# Patient Record
Sex: FEMALE | Race: WHITE | NOT HISPANIC OR LATINO | Employment: OTHER | URBAN - METROPOLITAN AREA
[De-identification: names, ages, dates, MRNs, and addresses within clinical notes are randomized per-mention and may not be internally consistent; named-entity substitution may affect disease eponyms.]

---

## 2023-12-07 PROBLEM — J02.9 SORE THROAT: Status: RESOLVED | Noted: 2018-08-17 | Resolved: 2023-12-07

## 2023-12-07 PROBLEM — R07.9 CHEST PAIN: Status: RESOLVED | Noted: 2020-09-10 | Resolved: 2023-12-07

## 2023-12-07 PROBLEM — E78.00 PURE HYPERCHOLESTEROLEMIA, UNSPECIFIED: Status: ACTIVE | Noted: 2023-12-07

## 2023-12-07 PROBLEM — Z85.828 PERSONAL HISTORY OF OTHER MALIGNANT NEOPLASM OF SKIN: Status: ACTIVE | Noted: 2023-01-25

## 2023-12-07 PROBLEM — J01.90 ACUTE SINUSITIS: Status: RESOLVED | Noted: 2020-03-13 | Resolved: 2023-12-07

## 2023-12-07 PROBLEM — D22.60 MELANOCYTIC NEVI OF UNSPECIFIED UPPER LIMB, INCLUDING SHOULDER: Status: ACTIVE | Noted: 2023-01-25

## 2023-12-07 PROBLEM — I35.9 AORTIC VALVE CALCIFICATION: Status: ACTIVE | Noted: 2023-12-07

## 2023-12-07 PROBLEM — L81.4 OTHER MELANIN HYPERPIGMENTATION: Status: ACTIVE | Noted: 2023-01-25

## 2023-12-07 PROBLEM — L90.5 SCAR CONDITION AND FIBROSIS OF SKIN: Status: ACTIVE | Noted: 2023-01-25

## 2023-12-07 PROBLEM — M25.551 RIGHT HIP PAIN: Status: RESOLVED | Noted: 2023-12-07 | Resolved: 2023-12-07

## 2023-12-07 PROBLEM — L82.1 OTHER SEBORRHEIC KERATOSIS: Status: ACTIVE | Noted: 2023-01-25

## 2023-12-07 PROBLEM — M67.432 GANGLION, LEFT WRIST: Status: ACTIVE | Noted: 2023-01-25

## 2023-12-07 PROBLEM — M53.3 TAIL BONE PAIN: Status: RESOLVED | Noted: 2023-12-07 | Resolved: 2023-12-07

## 2023-12-07 PROBLEM — E55.9 HYPOVITAMINOSIS D: Status: ACTIVE | Noted: 2023-12-07

## 2023-12-07 PROBLEM — D18.01 HEMANGIOMA OF SKIN AND SUBCUTANEOUS TISSUE: Status: ACTIVE | Noted: 2023-01-25

## 2023-12-07 PROBLEM — R05.9 COUGH: Status: RESOLVED | Noted: 2020-03-13 | Resolved: 2023-12-07

## 2023-12-07 PROBLEM — D48.5 NEOPLASM OF UNCERTAIN BEHAVIOR OF SKIN: Status: ACTIVE | Noted: 2023-01-25

## 2023-12-07 PROBLEM — M54.50 LOWER BACK PAIN: Status: RESOLVED | Noted: 2023-12-07 | Resolved: 2023-12-07

## 2023-12-07 PROBLEM — D22.5 MELANOCYTIC NEVI OF TRUNK: Status: ACTIVE | Noted: 2023-01-25

## 2023-12-07 PROBLEM — L57.0 ACTINIC KERATOSIS: Status: ACTIVE | Noted: 2023-01-25

## 2023-12-07 PROBLEM — D22.70 MELANOCYTIC NEVI OF UNSPECIFIED LOWER LIMB, INCLUDING HIP: Status: ACTIVE | Noted: 2023-01-25

## 2023-12-07 PROBLEM — L91.8 OTHER HYPERTROPHIC DISORDERS OF THE SKIN: Status: ACTIVE | Noted: 2023-01-25

## 2023-12-07 PROBLEM — G25.81 RESTLESS LEG SYNDROME: Status: ACTIVE | Noted: 2023-12-07

## 2023-12-07 PROBLEM — L28.1 PRURIGO NODULARIS: Status: ACTIVE | Noted: 2023-01-25

## 2023-12-07 PROBLEM — F32.A ANXIETY AND DEPRESSION: Status: ACTIVE | Noted: 2023-12-07

## 2023-12-07 PROBLEM — R91.1 SOLITARY PULMONARY NODULE: Status: ACTIVE | Noted: 2023-12-07

## 2023-12-07 PROBLEM — M85.80 OSTEOPENIA: Status: ACTIVE | Noted: 2023-12-07

## 2023-12-07 PROBLEM — R53.83 FATIGUE: Status: ACTIVE | Noted: 2023-12-07

## 2023-12-07 PROBLEM — F41.9 ANXIETY AND DEPRESSION: Status: ACTIVE | Noted: 2023-12-07

## 2023-12-07 PROBLEM — D23.9 OTHER BENIGN NEOPLASM OF SKIN, UNSPECIFIED: Status: ACTIVE | Noted: 2023-01-25

## 2023-12-07 PROBLEM — M25.50 ARTHRALGIA OF MULTIPLE JOINTS: Status: ACTIVE | Noted: 2023-12-07

## 2023-12-07 PROBLEM — C44.311 BASAL CELL CARCINOMA OF SKIN OF NOSE: Status: ACTIVE | Noted: 2023-01-25

## 2023-12-07 PROBLEM — R11.0 NAUSEA IN ADULT: Status: ACTIVE | Noted: 2023-12-07

## 2023-12-07 PROBLEM — R25.2 BILATERAL LEG CRAMPS: Status: ACTIVE | Noted: 2020-09-10

## 2023-12-07 RX ORDER — ESCITALOPRAM OXALATE 10 MG/1
1 TABLET ORAL DAILY
COMMUNITY
Start: 2021-02-02 | End: 2023-12-11 | Stop reason: ALTCHOICE

## 2023-12-07 RX ORDER — PANTOPRAZOLE SODIUM 40 MG/1
1 TABLET, DELAYED RELEASE ORAL DAILY
COMMUNITY
Start: 2022-07-26 | End: 2023-12-11 | Stop reason: ALTCHOICE

## 2023-12-07 ASSESSMENT — PROMIS GLOBAL HEALTH SCALE
RATE_SOCIAL_SATISFACTION: VERY GOOD
RATE_PHYSICAL_HEALTH: VERY GOOD
RATE_GENERAL_HEALTH: VERY GOOD
CARRYOUT_SOCIAL_ACTIVITIES: VERY GOOD
CARRYOUT_PHYSICAL_ACTIVITIES: COMPLETELY
RATE_MENTAL_HEALTH: VERY GOOD
RATE_AVERAGE_FATIGUE: MILD
RATE_AVERAGE_PAIN: 2
RATE_QUALITY_OF_LIFE: VERY GOOD
EMOTIONAL_PROBLEMS: NEVER

## 2023-12-11 ENCOUNTER — OFFICE VISIT (OUTPATIENT)
Dept: PRIMARY CARE | Facility: CLINIC | Age: 68
End: 2023-12-11
Payer: MEDICARE

## 2023-12-11 VITALS
BODY MASS INDEX: 26.89 KG/M2 | DIASTOLIC BLOOD PRESSURE: 64 MMHG | WEIGHT: 157.5 LBS | TEMPERATURE: 97.9 F | HEART RATE: 60 BPM | HEIGHT: 64 IN | SYSTOLIC BLOOD PRESSURE: 100 MMHG | OXYGEN SATURATION: 96 %

## 2023-12-11 DIAGNOSIS — Z23 NEED FOR INFLUENZA VACCINATION: ICD-10-CM

## 2023-12-11 DIAGNOSIS — M85.80 OSTEOPENIA, UNSPECIFIED LOCATION: ICD-10-CM

## 2023-12-11 DIAGNOSIS — M70.61 TROCHANTERIC BURSITIS OF RIGHT HIP: ICD-10-CM

## 2023-12-11 DIAGNOSIS — Z12.31 ENCOUNTER FOR SCREENING MAMMOGRAM FOR MALIGNANT NEOPLASM OF BREAST: ICD-10-CM

## 2023-12-11 DIAGNOSIS — Z00.00 ROUTINE GENERAL MEDICAL EXAMINATION AT HEALTH CARE FACILITY: Primary | ICD-10-CM

## 2023-12-11 DIAGNOSIS — E78.00 PURE HYPERCHOLESTEROLEMIA, UNSPECIFIED: ICD-10-CM

## 2023-12-11 PROCEDURE — 1159F MED LIST DOCD IN RCRD: CPT | Performed by: FAMILY MEDICINE

## 2023-12-11 PROCEDURE — G0008 ADMIN INFLUENZA VIRUS VAC: HCPCS | Performed by: FAMILY MEDICINE

## 2023-12-11 PROCEDURE — 90662 IIV NO PRSV INCREASED AG IM: CPT | Performed by: FAMILY MEDICINE

## 2023-12-11 PROCEDURE — 99213 OFFICE O/P EST LOW 20 MIN: CPT | Performed by: FAMILY MEDICINE

## 2023-12-11 PROCEDURE — 1170F FXNL STATUS ASSESSED: CPT | Performed by: FAMILY MEDICINE

## 2023-12-11 PROCEDURE — 1036F TOBACCO NON-USER: CPT | Performed by: FAMILY MEDICINE

## 2023-12-11 PROCEDURE — G0439 PPPS, SUBSEQ VISIT: HCPCS | Performed by: FAMILY MEDICINE

## 2023-12-11 PROCEDURE — 1160F RVW MEDS BY RX/DR IN RCRD: CPT | Performed by: FAMILY MEDICINE

## 2023-12-11 RX ORDER — METHYLPREDNISOLONE 4 MG/1
TABLET ORAL
Qty: 21 TABLET | Refills: 0 | Status: SHIPPED | OUTPATIENT
Start: 2023-12-11 | End: 2023-12-18

## 2023-12-11 ASSESSMENT — ACTIVITIES OF DAILY LIVING (ADL)
TAKING_MEDICATION: INDEPENDENT
DRESSING: INDEPENDENT
MANAGING_FINANCES: INDEPENDENT
BATHING: INDEPENDENT
DOING_HOUSEWORK: INDEPENDENT
GROCERY_SHOPPING: INDEPENDENT

## 2023-12-11 ASSESSMENT — PATIENT HEALTH QUESTIONNAIRE - PHQ9
1. LITTLE INTEREST OR PLEASURE IN DOING THINGS: NOT AT ALL
2. FEELING DOWN, DEPRESSED OR HOPELESS: NOT AT ALL
SUM OF ALL RESPONSES TO PHQ9 QUESTIONS 1 AND 2: 0

## 2023-12-11 NOTE — PATIENT INSTRUCTIONS
Get your blood work as ordered.  You should hear from our office with results whether they are normal are not within a few days.  Please call the office if you do not hear from us. \    After you get testing done you will get notified from our office with regards to your results whether they are normal or not.  If you are registered in the electronic health record we will send you information in that system.  If you have any questions or need clarification please feel free to call the office.         Your bone density shows osteopenia which is a thinning of the bones.  I recommend that you take vitamin D 1000 units daily, get an adequate intake of calcium daily.  Postmenopausal women should get 1500 mg of calcium per day.  This is about 4-5 servings per day of calcium rich foods such as dairy products or almond milk.  If you are not getting 1500 mg per day and would recommend adding on a calcium supplement.  Also doing weightbearing exercise is helpful.  We should repeat a DEXA scan 2 years.      Recommend current Covid booster, flu vaccine  RSV vaccine discussed

## 2023-12-11 NOTE — PROGRESS NOTES
"Subjective   Reason for Visit: Sherice Apodaca is an 68 y.o. female here for a Medicare Wellness visit.     Past Medical, Surgical, and Family History reviewed and updated in chart.    Reviewed all medications by prescribing practitioner or clinical pharmacist (such as prescriptions, OTCs, herbal therapies and supplements) and documented in the medical record.    Osteopenia     walking routinely     Has right hip pain   Worse with laying on that side     Xrays in past     Mood is good     GERD is ok         Patient Care Team:  Melody Lin MD as PCP - General (Family Medicine)  Melody Lin MD as PCP [facility]     Review of Systems    Objective   Vitals:  /64   Pulse 60   Temp 36.6 °C (97.9 °F)   Ht 1.626 m (5' 4\")   Wt 71.4 kg (157 lb 8 oz)   SpO2 96%   BMI 27.03 kg/m²       Physical Exam  Constitutional:       General: She is not in acute distress.     Appearance: Normal appearance.   HENT:      Head: Normocephalic and atraumatic.      Right Ear: Tympanic membrane, ear canal and external ear normal.      Left Ear: Tympanic membrane, ear canal and external ear normal.      Nose: Nose normal. No congestion or rhinorrhea.      Mouth/Throat:      Mouth: Mucous membranes are moist.      Pharynx: No oropharyngeal exudate or posterior oropharyngeal erythema.   Eyes:      Extraocular Movements: Extraocular movements intact.      Conjunctiva/sclera: Conjunctivae normal.      Pupils: Pupils are equal, round, and reactive to light.   Neck:      Vascular: No carotid bruit.   Cardiovascular:      Rate and Rhythm: Normal rate and regular rhythm.      Heart sounds: No murmur heard.  Pulmonary:      Effort: Pulmonary effort is normal.      Breath sounds: Normal breath sounds. No wheezing or rhonchi.   Abdominal:      General: Bowel sounds are normal.      Palpations: Abdomen is soft.   Musculoskeletal:         General: No swelling.      Cervical back: No rigidity.      Right lower leg: No " edema.      Left lower leg: No edema.      Comments: Tenderness to palpation along the right greater trochanter  Right hip: No pain with rotation   Lymphadenopathy:      Cervical: No cervical adenopathy.   Skin:     General: Skin is warm and dry.      Findings: No rash.   Neurological:      General: No focal deficit present.      Mental Status: She is alert and oriented to person, place, and time.      Cranial Nerves: No cranial nerve deficit.      Gait: Gait normal.      Deep Tendon Reflexes: Reflexes normal.   Psychiatric:         Mood and Affect: Mood normal.         Behavior: Behavior normal.         Assessment/Plan   Problem List Items Addressed This Visit       Anxiety and depression    Relevant Orders    CBC    Comprehensive Metabolic Panel    Lipid Panel    Vitamin D 25-Hydroxy,Total (for eval of Vitamin D levels)    Osteopenia    Relevant Orders    CBC    Comprehensive Metabolic Panel    Lipid Panel    Vitamin D 25-Hydroxy,Total (for eval of Vitamin D levels)    Pure hypercholesterolemia, unspecified    Relevant Orders    CBC    Comprehensive Metabolic Panel    Lipid Panel    Vitamin D 25-Hydroxy,Total (for eval of Vitamin D levels)     Other Visit Diagnoses       Routine general medical examination at health care facility    -  Primary    Relevant Orders    CBC    Comprehensive Metabolic Panel    Lipid Panel    Vitamin D 25-Hydroxy,Total (for eval of Vitamin D levels)    Gastroesophageal reflux disease without esophagitis        Relevant Orders    CBC    Comprehensive Metabolic Panel    Lipid Panel    Vitamin D 25-Hydroxy,Total (for eval of Vitamin D levels)    Encounter for screening mammogram for malignant neoplasm of breast        Relevant Orders    BI mammo bilateral screening tomosynthesis    CBC    Comprehensive Metabolic Panel    Lipid Panel    Vitamin D 25-Hydroxy,Total (for eval of Vitamin D levels)

## 2023-12-12 ENCOUNTER — LAB (OUTPATIENT)
Dept: LAB | Facility: LAB | Age: 68
End: 2023-12-12
Payer: MEDICARE

## 2023-12-12 DIAGNOSIS — M85.80 OSTEOPENIA, UNSPECIFIED LOCATION: ICD-10-CM

## 2023-12-12 DIAGNOSIS — E78.00 PURE HYPERCHOLESTEROLEMIA, UNSPECIFIED: ICD-10-CM

## 2023-12-12 DIAGNOSIS — Z12.31 ENCOUNTER FOR SCREENING MAMMOGRAM FOR MALIGNANT NEOPLASM OF BREAST: ICD-10-CM

## 2023-12-12 DIAGNOSIS — Z00.00 ROUTINE GENERAL MEDICAL EXAMINATION AT HEALTH CARE FACILITY: ICD-10-CM

## 2023-12-12 LAB
25(OH)D3 SERPL-MCNC: 18 NG/ML (ref 30–100)
ALBUMIN SERPL BCP-MCNC: 4.1 G/DL (ref 3.4–5)
ALP SERPL-CCNC: 47 U/L (ref 33–136)
ALT SERPL W P-5'-P-CCNC: 15 U/L (ref 7–45)
ANION GAP SERPL CALC-SCNC: 13 MMOL/L (ref 10–20)
AST SERPL W P-5'-P-CCNC: 14 U/L (ref 9–39)
BILIRUB SERPL-MCNC: 0.5 MG/DL (ref 0–1.2)
BUN SERPL-MCNC: 17 MG/DL (ref 6–23)
CALCIUM SERPL-MCNC: 9.1 MG/DL (ref 8.6–10.3)
CHLORIDE SERPL-SCNC: 106 MMOL/L (ref 98–107)
CHOLEST SERPL-MCNC: 210 MG/DL (ref 0–199)
CHOLESTEROL/HDL RATIO: 3.4
CO2 SERPL-SCNC: 27 MMOL/L (ref 21–32)
CREAT SERPL-MCNC: 0.71 MG/DL (ref 0.5–1.05)
ERYTHROCYTE [DISTWIDTH] IN BLOOD BY AUTOMATED COUNT: 12.3 % (ref 11.5–14.5)
GFR SERPL CREATININE-BSD FRML MDRD: >90 ML/MIN/1.73M*2
GLUCOSE SERPL-MCNC: 88 MG/DL (ref 74–99)
HCT VFR BLD AUTO: 40.6 % (ref 36–46)
HDLC SERPL-MCNC: 60.9 MG/DL
HGB BLD-MCNC: 12.9 G/DL (ref 12–16)
LDLC SERPL CALC-MCNC: 135 MG/DL
MCH RBC QN AUTO: 30.5 PG (ref 26–34)
MCHC RBC AUTO-ENTMCNC: 31.8 G/DL (ref 32–36)
MCV RBC AUTO: 96 FL (ref 80–100)
NON HDL CHOLESTEROL: 149 MG/DL (ref 0–149)
NRBC BLD-RTO: 0 /100 WBCS (ref 0–0)
PLATELET # BLD AUTO: 284 X10*3/UL (ref 150–450)
POTASSIUM SERPL-SCNC: 4.8 MMOL/L (ref 3.5–5.3)
PROT SERPL-MCNC: 6.4 G/DL (ref 6.4–8.2)
RBC # BLD AUTO: 4.23 X10*6/UL (ref 4–5.2)
SODIUM SERPL-SCNC: 141 MMOL/L (ref 136–145)
TRIGL SERPL-MCNC: 69 MG/DL (ref 0–149)
VLDL: 14 MG/DL (ref 0–40)
WBC # BLD AUTO: 4.8 X10*3/UL (ref 4.4–11.3)

## 2023-12-12 PROCEDURE — 85027 COMPLETE CBC AUTOMATED: CPT

## 2023-12-12 PROCEDURE — 80053 COMPREHEN METABOLIC PANEL: CPT

## 2023-12-12 PROCEDURE — 82306 VITAMIN D 25 HYDROXY: CPT

## 2023-12-12 PROCEDURE — 80061 LIPID PANEL: CPT

## 2023-12-12 PROCEDURE — 36415 COLL VENOUS BLD VENIPUNCTURE: CPT

## 2023-12-13 DIAGNOSIS — M85.80 OSTEOPENIA, UNSPECIFIED LOCATION: ICD-10-CM

## 2023-12-13 DIAGNOSIS — R79.89 LOW VITAMIN D LEVEL: Primary | ICD-10-CM

## 2023-12-13 NOTE — RESULT ENCOUNTER NOTE
Very slightly elevated cholesterol, recommend keep weight down regular exercise  Vitamin D is pretty low I would recommend 2000 units/day over-the-counter and repeat vitamin D level in 3 months, rest of blood work normal

## 2024-01-22 ENCOUNTER — HOSPITAL ENCOUNTER (OUTPATIENT)
Dept: RADIOLOGY | Facility: HOSPITAL | Age: 69
Discharge: HOME | End: 2024-01-22
Payer: MEDICARE

## 2024-01-22 DIAGNOSIS — Z12.31 ENCOUNTER FOR SCREENING MAMMOGRAM FOR MALIGNANT NEOPLASM OF BREAST: ICD-10-CM

## 2024-01-22 PROCEDURE — 77063 BREAST TOMOSYNTHESIS BI: CPT | Performed by: RADIOLOGY

## 2024-01-22 PROCEDURE — 77067 SCR MAMMO BI INCL CAD: CPT

## 2024-01-22 PROCEDURE — 77067 SCR MAMMO BI INCL CAD: CPT | Performed by: RADIOLOGY

## 2024-01-31 ENCOUNTER — OFFICE VISIT (OUTPATIENT)
Dept: DERMATOLOGY | Facility: CLINIC | Age: 69
End: 2024-01-31
Payer: MEDICARE

## 2024-01-31 DIAGNOSIS — D22.9 BENIGN NEVUS: ICD-10-CM

## 2024-01-31 DIAGNOSIS — L57.9 SKIN CHANGES DUE TO CHRONIC EXPOSURE TO NONIONIZING RADIATION: ICD-10-CM

## 2024-01-31 DIAGNOSIS — L81.4 LENTIGO: ICD-10-CM

## 2024-01-31 DIAGNOSIS — L90.5 SCAR CONDITIONS AND FIBROSIS OF SKIN: ICD-10-CM

## 2024-01-31 DIAGNOSIS — L82.1 SEBORRHEIC KERATOSIS: ICD-10-CM

## 2024-01-31 DIAGNOSIS — Z85.828 HISTORY OF NONMELANOMA SKIN CANCER: ICD-10-CM

## 2024-01-31 DIAGNOSIS — D18.01 ANGIOMA OF SKIN: Primary | ICD-10-CM

## 2024-01-31 PROCEDURE — 1160F RVW MEDS BY RX/DR IN RCRD: CPT | Performed by: NURSE PRACTITIONER

## 2024-01-31 PROCEDURE — 99213 OFFICE O/P EST LOW 20 MIN: CPT | Performed by: NURSE PRACTITIONER

## 2024-01-31 PROCEDURE — 1159F MED LIST DOCD IN RCRD: CPT | Performed by: NURSE PRACTITIONER

## 2024-01-31 PROCEDURE — 1036F TOBACCO NON-USER: CPT | Performed by: NURSE PRACTITIONER

## 2024-01-31 NOTE — PROGRESS NOTES
Subjective     hSerice Apodaca is a 68 y.o. female who presents for the following: Skin Check.     Review of Systems:  No other skin or systemic complaints other than what is documented elsewhere in the note.    The following portions of the chart were reviewed this encounter and updated as appropriate:   Tobacco  Allergies  Meds  Problems  Med Hx  Surg Hx         Skin Cancer History  No skin cancer on file.      Specialty Problems          Dermatology Problems    Actinic keratosis    Hemangioma of skin and subcutaneous tissue    Melanocytic nevi of trunk    Melanocytic nevi of unspecified lower limb, including hip    Melanocytic nevi of unspecified upper limb, including shoulder    Neoplasm of uncertain behavior of skin    Other benign neoplasm of skin, unspecified    Other hypertrophic disorders of the skin    Other melanin hyperpigmentation    Other seborrheic keratosis    Personal history of other malignant neoplasm of skin    Prurigo nodularis    Scar condition and fibrosis of skin        Objective   Well appearing patient in no apparent distress; mood and affect are within normal limits.    A full examination was performed including scalp, head, eyes, ears, nose, lips, neck, chest, axillae, abdomen, back, buttocks, bilateral upper extremities, bilateral lower extremities, hands, feet, fingers, toes, fingernails, and toenails. All findings within normal limits unless otherwise noted below.      Assessment/Plan   1. Angioma of skin  Scattered cherry-red papule(s).    A cherry hemangioma is a small macule (small, flat, smooth area) or papule (small, solid bump) formed from an overgrowth of tiny blood vessels in the skin. Cherry hemangiomas are characteristically red or purplish in color. They often first appear in middle adulthood and usually increase in number with age. Cherry hemangiomas are noncancerous (benign) and are common in adults.    The present appearance of the lesion is not worrisome but it  should continue to be observed and testing/treatment may be warranted if change occurs.    Related Procedures  Follow Up In Dermatology - Established Patient    2. Benign nevus  Scattered, uniform and benign-appearing, regular brown melanocytic papules and macules.    1. Right axilla has a 2 x 2 mm black brown papule  2. Left buttocks has a 2.5 x 4 mm tan brown macule with homogenous pattern with reticular pattern on the medial aspect     The present appearance of the lesions are not worrisome but it should continue to be observed and testing/treatment may be warranted if change occurs.    Lesions being monitored are stable.     Related Procedures  Follow Up In Dermatology - Established Patient    3. Seborrheic keratosis  Stuck on verrucous, tan-brown papules and plaques.      Seborrheic keratoses are common noncancerous (benign) growths of unknown cause seen in adults due to a thickening of an area of the top skin layer. Seborrheic keratoses may appear as if they are stuck on to the skin. They have distinct borders, and they may appear as papules (small, solid bumps) or plaques (solid, raised patches that are bigger than a thumbnail). They may be the same color as your skin, or they may be pink, light brown, darker brown, or very dark brown, or sometimes may appear black.    There is no way to prevent new seborrheic keratoses from forming. Seborrheic keratoses can be removed, but removal is considered a cosmetic issue and is usually not covered by insurance.    PLAN  No treatment is needed unless there is irritation from clothing, such as itching or bleeding.  2.   Some lotions containing alpha hydroxy acids, salicylic acid, or urea may make the areas feel smoother with regular use but will not eliminate them.    Related Procedures  Follow Up In Dermatology - Established Patient    4. Lentigo  Scattered tan macules in sun-exposed areas.    A solar lentigo (plural, solar lentigines), also known as a sun-induced  freckle or senile lentigo, is a dark (hyperpigmented) lesion caused by natural or artificial ultraviolet (UV) light. Solar lentigines may be single or multiple. This type of lentigo is different from a simple lentigo (lentigo simplex) because it is caused by exposure to UV light. Solar lentigines are benign, but they do indicate excessive sun exposure, a risk factor for the development of skin cancer.    To prevent solar lentigines, avoid exposure to sunlight in midday (10 AM to 3 PM), wear sun-protective clothing (tightly woven clothes and hats), and apply sunscreen (SPF 30 UVA and UVB block).    The present appearance of the lesion is not worrisome but it should continue to be observed and testing/treatment may be warranted if change occurs.    Related Procedures  Follow Up In Dermatology - Established Patient    5. Scar conditions and fibrosis of skin  Right Nasal Sidewall  Well healed scar at the site(s) of prior treatment with no evidence of recurrence.          The scar is clear, there is no evidence of recurrence.  The present appearance of the scar is not worrisome but it should continue to be observed and testing/treatment may be warranted if change occurs.      Related Procedures  Follow Up In Dermatology - Established Patient    6. History of nonmelanoma skin cancer    ABCDEs of melanoma and atypical moles were discussed with the patient.    Patient was instructed to perform monthly self skin examination.  We recommended that the patient have regular full skin exams given an increased risk of subsequent skin cancers.    The patient was instructed to use sun protective behaviors including use of broad spectrum sunscreens and sun protective clothing to reduce risk of skin cancers.    Warning signs of non-melanoma skin cancer discussed.    Related Procedures  Follow Up In Dermatology - Established Patient    7. Skin changes due to chronic exposure to nonionizing radiation  Actinic changes in the form of  freckles, lentigines and hyper/hypopigmentation     ABCDEs of melanoma and atypical moles were discussed with the patient.    Patient was instructed to perform monthly self skin examination.  We recommended that the patient have regular full skin exams given an increased risk of subsequent skin cancers.    The patient was instructed to use sun protective behaviors including use of broad spectrum sunscreens and sun protective clothing to reduce risk of skin cancers.    Warning signs of non-melanoma skin cancer discussed.    Related Procedures  Follow Up In Dermatology - Established Patient        Return to clinic in 1 year for skin check/follow up or sooner if needed

## 2024-01-31 NOTE — PATIENT INSTRUCTIONS

## 2024-12-03 PROBLEM — R45.4 IRRITABLE MOOD: Status: ACTIVE | Noted: 2024-12-03

## 2024-12-03 PROBLEM — R63.5 WEIGHT GAIN: Status: ACTIVE | Noted: 2024-12-03

## 2024-12-03 PROBLEM — H25.13 NUCLEAR SENILE CATARACT OF BOTH EYES: Status: ACTIVE | Noted: 2017-09-06

## 2024-12-03 PROBLEM — M76.00 GLUTEAL TENDINITIS: Status: ACTIVE | Noted: 2024-12-03

## 2024-12-03 PROBLEM — M19.032 OSTEOARTHRITIS OF LEFT WRIST: Status: ACTIVE | Noted: 2024-12-03

## 2024-12-03 PROBLEM — M70.61 TROCHANTERIC BURSITIS OF RIGHT HIP: Status: ACTIVE | Noted: 2024-12-03

## 2024-12-05 ENCOUNTER — APPOINTMENT (OUTPATIENT)
Dept: PRIMARY CARE | Facility: CLINIC | Age: 69
End: 2024-12-05
Payer: MEDICARE

## 2025-01-02 ENCOUNTER — APPOINTMENT (OUTPATIENT)
Dept: PRIMARY CARE | Facility: CLINIC | Age: 70
End: 2025-01-02
Payer: MEDICARE

## 2025-01-02 VITALS
SYSTOLIC BLOOD PRESSURE: 108 MMHG | TEMPERATURE: 97.9 F | BODY MASS INDEX: 26.29 KG/M2 | DIASTOLIC BLOOD PRESSURE: 68 MMHG | HEIGHT: 64 IN | WEIGHT: 154 LBS | HEART RATE: 68 BPM | OXYGEN SATURATION: 97 %

## 2025-01-02 DIAGNOSIS — Z12.31 ENCOUNTER FOR SCREENING MAMMOGRAM FOR MALIGNANT NEOPLASM OF BREAST: ICD-10-CM

## 2025-01-02 DIAGNOSIS — Z00.00 ROUTINE GENERAL MEDICAL EXAMINATION AT HEALTH CARE FACILITY: Primary | ICD-10-CM

## 2025-01-02 DIAGNOSIS — M70.61 TROCHANTERIC BURSITIS OF RIGHT HIP: ICD-10-CM

## 2025-01-02 DIAGNOSIS — E78.2 COMBINED HYPERLIPIDEMIA: ICD-10-CM

## 2025-01-02 DIAGNOSIS — I35.0 NONRHEUMATIC AORTIC VALVE STENOSIS: ICD-10-CM

## 2025-01-02 DIAGNOSIS — E55.9 HYPOVITAMINOSIS D: ICD-10-CM

## 2025-01-02 DIAGNOSIS — Z13.220 LIPID SCREENING: ICD-10-CM

## 2025-01-02 DIAGNOSIS — M85.89 OSTEOPENIA OF MULTIPLE SITES: ICD-10-CM

## 2025-01-02 DIAGNOSIS — E78.2 MIXED HYPERLIPIDEMIA: ICD-10-CM

## 2025-01-02 DIAGNOSIS — M25.551 PAIN OF RIGHT HIP: ICD-10-CM

## 2025-01-02 PROBLEM — F41.9 ANXIETY AND DEPRESSION: Status: RESOLVED | Noted: 2023-12-07 | Resolved: 2025-01-02

## 2025-01-02 PROBLEM — D23.9 OTHER BENIGN NEOPLASM OF SKIN, UNSPECIFIED: Status: RESOLVED | Noted: 2023-01-25 | Resolved: 2025-01-02

## 2025-01-02 PROBLEM — M25.50 ARTHRALGIA OF MULTIPLE JOINTS: Status: RESOLVED | Noted: 2023-12-07 | Resolved: 2025-01-02

## 2025-01-02 PROBLEM — M67.432 GANGLION, LEFT WRIST: Status: RESOLVED | Noted: 2023-01-25 | Resolved: 2025-01-02

## 2025-01-02 PROBLEM — R11.0 NAUSEA IN ADULT: Status: RESOLVED | Noted: 2023-12-07 | Resolved: 2025-01-02

## 2025-01-02 PROBLEM — D48.5 NEOPLASM OF UNCERTAIN BEHAVIOR OF SKIN: Status: RESOLVED | Noted: 2023-01-25 | Resolved: 2025-01-02

## 2025-01-02 PROBLEM — R45.4 IRRITABLE MOOD: Status: RESOLVED | Noted: 2024-12-03 | Resolved: 2025-01-02

## 2025-01-02 PROBLEM — E78.00 PURE HYPERCHOLESTEROLEMIA, UNSPECIFIED: Status: RESOLVED | Noted: 2023-12-07 | Resolved: 2025-01-02

## 2025-01-02 PROBLEM — C44.311 BASAL CELL CARCINOMA OF SKIN OF NOSE: Status: RESOLVED | Noted: 2023-01-25 | Resolved: 2025-01-02

## 2025-01-02 PROBLEM — R63.5 WEIGHT GAIN: Status: RESOLVED | Noted: 2024-12-03 | Resolved: 2025-01-02

## 2025-01-02 PROBLEM — R25.2 BILATERAL LEG CRAMPS: Status: RESOLVED | Noted: 2020-09-10 | Resolved: 2025-01-02

## 2025-01-02 PROBLEM — M76.00 GLUTEAL TENDINITIS: Status: RESOLVED | Noted: 2024-12-03 | Resolved: 2025-01-02

## 2025-01-02 PROBLEM — D22.70 MELANOCYTIC NEVI OF UNSPECIFIED LOWER LIMB, INCLUDING HIP: Status: RESOLVED | Noted: 2023-01-25 | Resolved: 2025-01-02

## 2025-01-02 PROBLEM — L90.5 SCAR CONDITION AND FIBROSIS OF SKIN: Status: RESOLVED | Noted: 2023-01-25 | Resolved: 2025-01-02

## 2025-01-02 PROBLEM — D22.5 MELANOCYTIC NEVI OF TRUNK: Status: RESOLVED | Noted: 2023-01-25 | Resolved: 2025-01-02

## 2025-01-02 PROBLEM — L81.4 OTHER MELANIN HYPERPIGMENTATION: Status: RESOLVED | Noted: 2023-01-25 | Resolved: 2025-01-02

## 2025-01-02 PROBLEM — F32.A ANXIETY AND DEPRESSION: Status: RESOLVED | Noted: 2023-12-07 | Resolved: 2025-01-02

## 2025-01-02 PROBLEM — R53.83 FATIGUE: Status: RESOLVED | Noted: 2023-12-07 | Resolved: 2025-01-02

## 2025-01-02 PROCEDURE — 1124F ACP DISCUSS-NO DSCNMKR DOCD: CPT | Performed by: FAMILY MEDICINE

## 2025-01-02 PROCEDURE — 1160F RVW MEDS BY RX/DR IN RCRD: CPT | Performed by: FAMILY MEDICINE

## 2025-01-02 PROCEDURE — 1159F MED LIST DOCD IN RCRD: CPT | Performed by: FAMILY MEDICINE

## 2025-01-02 PROCEDURE — 1036F TOBACCO NON-USER: CPT | Performed by: FAMILY MEDICINE

## 2025-01-02 PROCEDURE — G0008 ADMIN INFLUENZA VIRUS VAC: HCPCS | Performed by: FAMILY MEDICINE

## 2025-01-02 PROCEDURE — 90662 IIV NO PRSV INCREASED AG IM: CPT | Performed by: FAMILY MEDICINE

## 2025-01-02 PROCEDURE — 99214 OFFICE O/P EST MOD 30 MIN: CPT | Performed by: FAMILY MEDICINE

## 2025-01-02 PROCEDURE — G0439 PPPS, SUBSEQ VISIT: HCPCS | Performed by: FAMILY MEDICINE

## 2025-01-02 PROCEDURE — 1170F FXNL STATUS ASSESSED: CPT | Performed by: FAMILY MEDICINE

## 2025-01-02 PROCEDURE — 3008F BODY MASS INDEX DOCD: CPT | Performed by: FAMILY MEDICINE

## 2025-01-02 RX ORDER — METHYLPREDNISOLONE 4 MG/1
TABLET ORAL
Qty: 21 TABLET | Refills: 0 | Status: SHIPPED | OUTPATIENT
Start: 2025-01-02 | End: 2025-01-08

## 2025-01-02 ASSESSMENT — ACTIVITIES OF DAILY LIVING (ADL)
DRESSING: INDEPENDENT
GROCERY_SHOPPING: INDEPENDENT
DRESSING: INDEPENDENT
BATHING: INDEPENDENT
TAKING_MEDICATION: INDEPENDENT
MANAGING_FINANCES: INDEPENDENT
DOING_HOUSEWORK: INDEPENDENT
BATHING: INDEPENDENT

## 2025-01-02 ASSESSMENT — ENCOUNTER SYMPTOMS
CONSTIPATION: 0
SHORTNESS OF BREATH: 0
DIARRHEA: 0

## 2025-01-02 ASSESSMENT — PATIENT HEALTH QUESTIONNAIRE - PHQ9
SUM OF ALL RESPONSES TO PHQ9 QUESTIONS 1 AND 2: 0
2. FEELING DOWN, DEPRESSED OR HOPELESS: NOT AT ALL
1. LITTLE INTEREST OR PLEASURE IN DOING THINGS: NOT AT ALL

## 2025-01-02 NOTE — PATIENT INSTRUCTIONS
You should be getting cardiovascular exercise 3-5 times per week for 30-45 minutes.  This includes exercises such as running, brisk walking, biking or swimming.     Get your blood work as ordered.  You should hear from our office with results whether they are normal are not within a few days.  Please call the office if you do not hear from us.     Due for colonoscopy next December    Mammogram    Trochanteric bursitis   Trial treatment of steroids  Referral to orthopedics for potential injection    Aortic stenosis, mild: Watch for shortness of breath on exertion    Patient already has advanced directives.  Please bring a copy or mail us a copy of your advance directive so we can place it in your medical chart.

## 2025-01-02 NOTE — ASSESSMENT & PLAN NOTE
Orders:    Comprehensive Metabolic Panel; Future    CBC and Auto Differential; Future    Lipid Panel; Future    Vitamin D 25-Hydroxy,Total (for eval of Vitamin D levels); Future

## 2025-01-06 ENCOUNTER — LAB (OUTPATIENT)
Dept: LAB | Facility: LAB | Age: 70
End: 2025-01-06
Payer: MEDICARE

## 2025-01-06 DIAGNOSIS — E55.9 HYPOVITAMINOSIS D: ICD-10-CM

## 2025-01-06 DIAGNOSIS — Z13.220 LIPID SCREENING: ICD-10-CM

## 2025-01-06 DIAGNOSIS — Z00.00 ROUTINE GENERAL MEDICAL EXAMINATION AT HEALTH CARE FACILITY: ICD-10-CM

## 2025-01-06 DIAGNOSIS — E78.2 MIXED HYPERLIPIDEMIA: ICD-10-CM

## 2025-01-06 LAB
25(OH)D3 SERPL-MCNC: 23 NG/ML (ref 30–100)
ALBUMIN SERPL BCP-MCNC: 3.9 G/DL (ref 3.4–5)
ALP SERPL-CCNC: 45 U/L (ref 33–136)
ALT SERPL W P-5'-P-CCNC: 15 U/L (ref 7–45)
ANION GAP SERPL CALC-SCNC: 13 MMOL/L (ref 10–20)
AST SERPL W P-5'-P-CCNC: 15 U/L (ref 9–39)
BASOPHILS # BLD AUTO: 0.02 X10*3/UL (ref 0–0.1)
BASOPHILS NFR BLD AUTO: 0.4 %
BILIRUB SERPL-MCNC: 0.5 MG/DL (ref 0–1.2)
BUN SERPL-MCNC: 15 MG/DL (ref 6–23)
CALCIUM SERPL-MCNC: 8.4 MG/DL (ref 8.6–10.3)
CHLORIDE SERPL-SCNC: 106 MMOL/L (ref 98–107)
CHOLEST SERPL-MCNC: 216 MG/DL (ref 0–199)
CHOLESTEROL/HDL RATIO: 3.1
CO2 SERPL-SCNC: 28 MMOL/L (ref 21–32)
CREAT SERPL-MCNC: 0.64 MG/DL (ref 0.5–1.05)
EGFRCR SERPLBLD CKD-EPI 2021: >90 ML/MIN/1.73M*2
EOSINOPHIL # BLD AUTO: 0.23 X10*3/UL (ref 0–0.7)
EOSINOPHIL NFR BLD AUTO: 5 %
ERYTHROCYTE [DISTWIDTH] IN BLOOD BY AUTOMATED COUNT: 12.7 % (ref 11.5–14.5)
GLUCOSE SERPL-MCNC: 87 MG/DL (ref 74–99)
HCT VFR BLD AUTO: 38.3 % (ref 36–46)
HDLC SERPL-MCNC: 69.4 MG/DL
HGB BLD-MCNC: 12.2 G/DL (ref 12–16)
IMM GRANULOCYTES # BLD AUTO: 0.01 X10*3/UL (ref 0–0.7)
IMM GRANULOCYTES NFR BLD AUTO: 0.2 % (ref 0–0.9)
LDLC SERPL CALC-MCNC: 133 MG/DL
LYMPHOCYTES # BLD AUTO: 2.01 X10*3/UL (ref 1.2–4.8)
LYMPHOCYTES NFR BLD AUTO: 43.7 %
MCH RBC QN AUTO: 31 PG (ref 26–34)
MCHC RBC AUTO-ENTMCNC: 31.9 G/DL (ref 32–36)
MCV RBC AUTO: 97 FL (ref 80–100)
MONOCYTES # BLD AUTO: 0.42 X10*3/UL (ref 0.1–1)
MONOCYTES NFR BLD AUTO: 9.1 %
NEUTROPHILS # BLD AUTO: 1.91 X10*3/UL (ref 1.2–7.7)
NEUTROPHILS NFR BLD AUTO: 41.6 %
NON HDL CHOLESTEROL: 147 MG/DL (ref 0–149)
NRBC BLD-RTO: 0 /100 WBCS (ref 0–0)
PLATELET # BLD AUTO: 258 X10*3/UL (ref 150–450)
POTASSIUM SERPL-SCNC: 4.2 MMOL/L (ref 3.5–5.3)
PROT SERPL-MCNC: 6.1 G/DL (ref 6.4–8.2)
RBC # BLD AUTO: 3.94 X10*6/UL (ref 4–5.2)
SODIUM SERPL-SCNC: 143 MMOL/L (ref 136–145)
TRIGL SERPL-MCNC: 66 MG/DL (ref 0–149)
VLDL: 13 MG/DL (ref 0–40)
WBC # BLD AUTO: 4.6 X10*3/UL (ref 4.4–11.3)

## 2025-01-06 PROCEDURE — 85025 COMPLETE CBC W/AUTO DIFF WBC: CPT

## 2025-01-06 PROCEDURE — 80061 LIPID PANEL: CPT

## 2025-01-06 PROCEDURE — 80053 COMPREHEN METABOLIC PANEL: CPT

## 2025-01-06 PROCEDURE — 82306 VITAMIN D 25 HYDROXY: CPT

## 2025-01-08 NOTE — RESULT ENCOUNTER NOTE
Vitamin D is a little low calcium is a little little low recommend taking a calcium with vitamin D supplement such as Caltrate or Os-Jt  Cholesterol is a little bit borderline recommend regular exercise low-fat diet  Rest of labs look okay

## 2025-01-21 ENCOUNTER — OFFICE VISIT (OUTPATIENT)
Dept: PHYSICAL MEDICINE AND REHAB | Facility: CLINIC | Age: 70
End: 2025-01-21
Payer: MEDICARE

## 2025-01-21 VITALS
RESPIRATION RATE: 18 BRPM | HEART RATE: 66 BPM | DIASTOLIC BLOOD PRESSURE: 72 MMHG | TEMPERATURE: 97.7 F | SYSTOLIC BLOOD PRESSURE: 112 MMHG

## 2025-01-21 DIAGNOSIS — M47.816 LUMBAR SPONDYLOSIS: ICD-10-CM

## 2025-01-21 DIAGNOSIS — M70.61 TROCHANTERIC BURSITIS OF RIGHT HIP: Primary | ICD-10-CM

## 2025-01-21 DIAGNOSIS — M54.16 RIGHT LUMBAR RADICULITIS: ICD-10-CM

## 2025-01-21 PROCEDURE — G2211 COMPLEX E/M VISIT ADD ON: HCPCS | Performed by: PHYSICAL MEDICINE & REHABILITATION

## 2025-01-21 PROCEDURE — 1036F TOBACCO NON-USER: CPT | Performed by: PHYSICAL MEDICINE & REHABILITATION

## 2025-01-21 PROCEDURE — 99204 OFFICE O/P NEW MOD 45 MIN: CPT | Performed by: PHYSICAL MEDICINE & REHABILITATION

## 2025-01-21 PROCEDURE — 1125F AMNT PAIN NOTED PAIN PRSNT: CPT | Performed by: PHYSICAL MEDICINE & REHABILITATION

## 2025-01-21 PROCEDURE — 99214 OFFICE O/P EST MOD 30 MIN: CPT | Performed by: PHYSICAL MEDICINE & REHABILITATION

## 2025-01-21 PROCEDURE — 1160F RVW MEDS BY RX/DR IN RCRD: CPT | Performed by: PHYSICAL MEDICINE & REHABILITATION

## 2025-01-21 PROCEDURE — 1159F MED LIST DOCD IN RCRD: CPT | Performed by: PHYSICAL MEDICINE & REHABILITATION

## 2025-01-21 RX ORDER — DICLOFENAC SODIUM 10 MG/G
4 GEL TOPICAL 4 TIMES DAILY
Qty: 100 G | Refills: 3 | Status: SHIPPED | OUTPATIENT
Start: 2025-01-21

## 2025-01-21 RX ORDER — GABAPENTIN 100 MG/1
100 CAPSULE ORAL NIGHTLY
Qty: 60 CAPSULE | Refills: 2 | Status: SHIPPED | OUTPATIENT
Start: 2025-01-21 | End: 2025-02-20

## 2025-01-21 ASSESSMENT — PAIN SCALES - GENERAL: PAINLEVEL_OUTOF10: 4

## 2025-01-21 NOTE — PROGRESS NOTES
Physical Medicine and Rehabilitation MSK Consult  01/21/25       Chief Complaint:  Low back pain     HPI:  Sherice Apodaca is a  69 y.o. F who presents to the clinic today  for evaluation of low back pain.  Onset: 6 years  Location: ar lateral thigh and radiates lateral thigh and to the knee, sometimes to R lateral thigh  Radiation: as above, stops mid calf, few times a week  Quality: cramping, aching  Duration: 3-4x/night  Aggravating factors:  wakes up from sleep, 3-4 x/week and needs to walk around few min and then its better  When laying on he right  Prolonged sitting  alleviating factors: aleve, walking,   Severity: 4  Numbness/Tingling: No  Bowel or bladder incontinence: No  Pt's current medication regimen includes:      Treatment to date:  Physical therapy: Yes - 6 years ago, wo relief  Went to [facility], didn't help  Medications taken to date for this complaint include the following:   Takes aleve w mild relief 1-2x/week  Prior injections: No     Occasionally has R lower back pain, doesn't always correlate w the leg.     Imaging    Reviewed w patient and personally 01/21/25     Xr hip and spine 2022  FINDINGS:  Right hip:  No acute fracture or malalignment.  Right hip joint space is maintained..  Soft tissues are within normal limits.     Lumbar spine:  Vertebral body heights are preserved. Posterior elements appear to be  intact.  There is grade 1 anterolisthesis at L4-5.  Disc heights of the lumbar spine are maintained. Moderate facet joint  degenerative changes are noted at L5-S1.     No spondylolysis.     IMPRESSION:  1. Right hip: Unremarkable radiographic evaluation of the right hip.  2. Lumbar spine: Moderate L5-S1 facet joint degenerative change.  Grade 1 L4-5 anterolisthesis.  Past Medical History:   Diagnosis Date    Acute sinusitis 03/13/2020    Anxiety disorder, unspecified 08/30/2021    Anxiety and depression    Arthritis 01/01/2018    Asymptomatic menopausal state 04/15/2019    History of menopause     Carpal tunnel syndrome, left upper limb 10/17/2016    Acute carpal tunnel syndrome of left wrist    Chest pain 09/10/2020    Cough 03/13/2020    Decreased white blood cell count, unspecified 11/13/2020    Leukopenia    Encounter for gynecological examination (general) (routine) without abnormal findings 04/10/2019    Encounter for routine gynecological examination    Encounter for screening for cardiovascular disorders 08/30/2021    Screening for cardiovascular condition    Encounter for screening for malignant neoplasm of cervix     Encounter for screening for malignant neoplasm of cervix    Encounter for screening for malignant neoplasm of colon 05/07/2019    Encounter for screening colonoscopy    Localized edema 11/09/2020    Edema, leg    Lower back pain 12/07/2023    Nausea 07/26/2022    Nausea in adult    Other specified soft tissue disorders 07/06/2015    Leg swelling    Other tear of medial meniscus, current injury, unspecified knee, initial encounter 12/29/2014    Acute medial meniscal tear    Pain in left hand 10/13/2016    Left hand pain    Pain in left wrist 10/17/2016    Left wrist pain    Pain in right hip 07/26/2022    Right hip pain    Pain in right hip 12/11/2018    Hip pain, right    Pain in right shoulder 12/31/2020    Right shoulder pain    Pain in unspecified hip 12/11/2018    Hip pain    Pain in unspecified joint 07/26/2022    Arthralgia of multiple joints    Pain in unspecified knee 10/16/2014    Joint pain, knee    Pain in unspecified lower leg 12/29/2014    Calf pain    Personal history of diseases of the skin and subcutaneous tissue 04/14/2021    History of actinic keratosis    Personal history of other diseases of the musculoskeletal system and connective tissue 07/26/2022    History of low back pain    Personal history of other diseases of the nervous system and sense organs 12/31/2020    History of restless legs syndrome    Personal history of other specified conditions 11/09/2020     History of chest pain    Personal history of other specified conditions 2014    History of weight gain    Personal history of other specified conditions 2014    History of irritability    Personal history of other specified conditions 2022    History of fatigue    Primary osteoarthritis, left wrist 10/17/2016    Primary osteoarthritis of left wrist    Pure hypercholesterolemia, unspecified     High cholesterol    Shortness of breath 2015    SOB (shortness of breath) on exertion    Tail bone pain 2023    Unspecified disorder of synovium and tendon, right thigh 2018    Tendinopathy of right gluteus medius    Unspecified internal derangement of unspecified knee 10/16/2014    Internal derangement of knee joint        Past Surgical History:   Procedure Laterality Date    APPENDECTOMY  2014    Laparoscopic Appendectomy    APPENDECTOMY  10/22/2018    Appendectomy     SECTION, CLASSIC  2014     Section     SECTION, LOW TRANSVERSE  1984    FRACTURE SURGERY  07/10/2016    OTHER SURGICAL HISTORY  2014    Wrist Surgery    TUBAL LIGATION  1984        Patient Active Problem List    Diagnosis Date Noted    Mixed hyperlipidemia 2025    Nonrheumatic aortic valve stenosis 2025    Osteoarthritis of left wrist 2024    Aortic valve calcification 2023    Hypovitaminosis D 2023    Osteopenia 2023    Restless leg syndrome 2023    Solitary pulmonary nodule 2023    Actinic keratosis 2023    Other seborrheic keratosis 2023    Melanocytic nevi of unspecified upper limb, including shoulder 2023    Hemangioma of skin and subcutaneous tissue 2023    Personal history of other malignant neoplasm of skin 2023    Prurigo nodularis 2023    Other hypertrophic disorders of the skin 2023    Nuclear senile cataract of both eyes 2017    Presbyopia 2013    Myopia  07/24/2013        Family History   Problem Relation Name Age of Onset    Arthritis Mother Rosetta Martinez     Atrial fibrillation Mother Rosetta Martinez     Alcohol abuse Father Jonathon Martinez     Cancer Father Jonathon Martinez     Heart disease Father Jonathon Martinez     Asthma Son Garrett Apodaca         No current outpatient medications on file.     No current facility-administered medications for this visit.        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   Eyes:  Denies change in visual acuity   HENT:  Denies nasal congestion or sore throat   Respiratory:  Denies cough or shortness of breath   Cardiovascular:  Denies chest pain or edema   GI:  Denies abdominal pain, nausea, vomiting, bloody stools or diarrhea   :  Denies dysuria   Integument:  Denies rash   Neurologic:  As per HPI  MSK: Per above HPI  Endocrine:  Denies polyuria or polydipsia   Lymphatic:  Denies swollen glands   Psychiatric:  Denies depression or anxiety            PHYSICAL EXAM:  /72 (BP Location: Left arm, Patient Position: Sitting)   Pulse 66   Temp 36.5 °C (97.7 °F)   Resp 18     General:  NAD, well developed, f      Psychiatric: appropriate mood & affect.   Cardiovascular:  Normal pedal pulses; no LE edema.  Respiratory:  Normal rate; unlabored breathing.  Skin:  Intact; no erythema; no ecchymosis or rash.  Lymphatic:  No lymphadenopathy or lymphedema.  NEURO:  Alert and appropriate.  Speech fluent, conversing appropriately.   Motor:    Rt: HF 5/5, KE 5/5, KF 5/5, DF 5/5, EHL 5/5, PF 5/5.    Lt: HF 5/5, KE 5/5, KF 5/5, DF 5/5, EHL 5/5, PF 5/5.  R hip abduction 3/5, L 4/5  Sensation:     Light touch: intact in the b/l L3-S1 dermatomes.    PP: intact in the b/l L3-S1 dermatomes  Reflexes:     Right:  patellar 2+, achilles 2+,     Left: patellar 2+, achilles 2+,     Babinski's downgoing b/l; no clonus  Gait: Normal, narrow based gait.     MSK:  Inspection reveals no evidence of a pelvic obliqity   Spinal range of motion: Flexion to 70° degrees, Extension of 10 degrees.  There is tenderness over the R psis and paraspinals and R greater troch  Special tests:    Straight leg raise: -bl    Slump test: -bl    Facet loading: + bl          Impression: Sherice Apodaca is a 69 y.o. f w no significant pmh presenting with R greater troch pain and intermittent R lumbar radiculitis, has known lumbar spondylosis as well as underlying glut med weakness.    Plan:  Orders Placed This Encounter   Procedures    Referral to Physical Therapy    1. Gabapentin 100 mg at night, ok to tole rate to 300 mg at night, can add am dose as well if needed  2. Voltaren gel prn  3. Might consider greater troch injection but will wait until therapy is completed  4. No need for repeat imaging, might rafy L spine mri of minimal relief w therapy.      Thank you for the consultation.     Mili Olson MD  Physical Medicine and Rehabilitation

## 2025-01-21 NOTE — PROGRESS NOTES
Ref by PCP for right hip pain that has been going on and off for several years.  Has no recent ,xray and no recent PT.

## 2025-01-22 ENCOUNTER — HOSPITAL ENCOUNTER (OUTPATIENT)
Dept: RADIOLOGY | Facility: HOSPITAL | Age: 70
Discharge: HOME | End: 2025-01-22
Payer: MEDICARE

## 2025-01-22 VITALS — WEIGHT: 154 LBS | HEIGHT: 64 IN | BODY MASS INDEX: 26.29 KG/M2

## 2025-01-22 DIAGNOSIS — Z12.31 ENCOUNTER FOR SCREENING MAMMOGRAM FOR MALIGNANT NEOPLASM OF BREAST: ICD-10-CM

## 2025-01-22 PROCEDURE — 77067 SCR MAMMO BI INCL CAD: CPT | Performed by: STUDENT IN AN ORGANIZED HEALTH CARE EDUCATION/TRAINING PROGRAM

## 2025-01-22 PROCEDURE — 77063 BREAST TOMOSYNTHESIS BI: CPT | Performed by: STUDENT IN AN ORGANIZED HEALTH CARE EDUCATION/TRAINING PROGRAM

## 2025-01-22 PROCEDURE — 77067 SCR MAMMO BI INCL CAD: CPT

## 2025-01-22 NOTE — RESULT ENCOUNTER NOTE
The mammogram shows an area on the right breast that needs further evaluation, the orders were signed, looks like they are already scheduled, I will let you know when I get the results

## 2025-01-29 ENCOUNTER — HOSPITAL ENCOUNTER (OUTPATIENT)
Dept: RADIOLOGY | Facility: HOSPITAL | Age: 70
Discharge: HOME | End: 2025-01-29
Payer: MEDICARE

## 2025-01-29 DIAGNOSIS — R92.8 OTHER ABNORMAL AND INCONCLUSIVE FINDINGS ON DIAGNOSTIC IMAGING OF BREAST: ICD-10-CM

## 2025-01-29 PROCEDURE — 76642 ULTRASOUND BREAST LIMITED: CPT | Mod: RIGHT SIDE | Performed by: STUDENT IN AN ORGANIZED HEALTH CARE EDUCATION/TRAINING PROGRAM

## 2025-01-29 PROCEDURE — 76982 USE 1ST TARGET LESION: CPT | Mod: RT

## 2025-01-29 PROCEDURE — 77061 BREAST TOMOSYNTHESIS UNI: CPT | Mod: RT

## 2025-01-29 PROCEDURE — 76642 ULTRASOUND BREAST LIMITED: CPT | Mod: RT

## 2025-01-29 PROCEDURE — G0279 TOMOSYNTHESIS, MAMMO: HCPCS | Mod: RIGHT SIDE | Performed by: STUDENT IN AN ORGANIZED HEALTH CARE EDUCATION/TRAINING PROGRAM

## 2025-01-29 PROCEDURE — 77065 DX MAMMO INCL CAD UNI: CPT | Mod: RIGHT SIDE | Performed by: STUDENT IN AN ORGANIZED HEALTH CARE EDUCATION/TRAINING PROGRAM

## 2025-01-30 DIAGNOSIS — R92.8 ABNORMAL MAMMOGRAM: Primary | ICD-10-CM

## 2025-01-30 NOTE — RESULT ENCOUNTER NOTE
The right breast shows benign-appearing cyst recommend mammogram and ultrasound in 6 months, order inputted

## 2025-01-31 ENCOUNTER — APPOINTMENT (OUTPATIENT)
Dept: DERMATOLOGY | Facility: CLINIC | Age: 70
End: 2025-01-31
Payer: MEDICARE

## 2025-01-31 DIAGNOSIS — L81.4 LENTIGO: ICD-10-CM

## 2025-01-31 DIAGNOSIS — L82.1 SEBORRHEIC KERATOSIS: ICD-10-CM

## 2025-01-31 DIAGNOSIS — Z85.828 HISTORY OF NONMELANOMA SKIN CANCER: ICD-10-CM

## 2025-01-31 DIAGNOSIS — D18.01 ANGIOMA OF SKIN: ICD-10-CM

## 2025-01-31 DIAGNOSIS — D22.9 BENIGN NEVUS: ICD-10-CM

## 2025-01-31 DIAGNOSIS — L57.9 SKIN CHANGES DUE TO CHRONIC EXPOSURE TO NONIONIZING RADIATION: ICD-10-CM

## 2025-01-31 DIAGNOSIS — L90.5 SCAR CONDITIONS AND FIBROSIS OF SKIN: ICD-10-CM

## 2025-01-31 DIAGNOSIS — L57.0 ACTINIC KERATOSIS: Primary | ICD-10-CM

## 2025-01-31 NOTE — PROGRESS NOTES
Subjective     Sherice Apodaca is a 69 y.o. female who presents for the following: Skin Check.     Review of Systems:  No other skin or systemic complaints other than what is documented elsewhere in the note.    The following portions of the chart were reviewed this encounter and updated as appropriate:   Tobacco  Allergies  Meds  Problems  Med Hx  Surg Hx         Skin Cancer History  No skin cancer on file.      Specialty Problems          Dermatology Problems    Actinic keratosis    Hemangioma of skin and subcutaneous tissue    Melanocytic nevi of unspecified upper limb, including shoulder    Other hypertrophic disorders of the skin    Other seborrheic keratosis    Personal history of other malignant neoplasm of skin    Prurigo nodularis        Objective   Well appearing patient in no apparent distress; mood and affect are within normal limits.    A full examination was performed including scalp, head, eyes, ears, nose, lips, neck, chest, axillae, abdomen, back, buttocks, bilateral upper extremities, bilateral lower extremities, hands, feet, fingers, toes, fingernails, and toenails. All findings within normal limits unless otherwise noted below.      Assessment/Plan   1. Actinic keratosis  Left Nasal Sidewall  Thin erythematous papules with gritty scale    WHAT IS ACTINIC KERATOSIS?   - Actinic keratosis (AK) is a skin condition caused by sun damage. It causes scaly, rough, or bumpy spots on the skin.  - If left alone, AKs may turn into a skin cancer. People who burn easily or have trouble tanning are at more risk for developing AKs.   - There is no one test for AKs and diagnosis is made by clinical appearance. Treatment options include cryotherapy, therapy with lights, and various creams (e.g., topical 5-fluorocuracil, imiquimod).       To lower the chance of getting AK, you can:       ?  Stay out of the sun in the middle of the day (from 10 a.m. to 4 p.m.)       ?  Wear sunscreen - An SPF of at least 30  is best. The SPF number is on the sunscreen bottle or tube.       ?  Wear a wide-brimmed hat, long-sleeved shirt, long pants, or long skirt outside. A baseball hat does not give much protection.        ?  Do not use tanning beds.        ?  Keep a low-fat diet, less than 21% of calories should come from fat       ?  Take Vitamin B3 (nicotinomide) 500mg twice daily.      YOUR TREATMENT PLAN  - At this time I recommend treatment with cryotherapy.  - Possible side effects of liquid nitrogen treatment reviewed including formation of blisters, crusting, tenderness, scar, and discoloration which may be permanent.  - Patient advised to return the office for re-evaluation if the treated lesion(s) do not resolve within 4-6 weeks. Patient verbalizes understanding.    Destr of lesion - Left Nasal Sidewall  Complexity: simple    Destruction method: cryotherapy    Informed consent: discussed and consent obtained    Timeout:  patient name, date of birth, surgical site, and procedure verified  Lesion destroyed using liquid nitrogen: Yes    Cryotherapy cycles:  2  Outcome: patient tolerated procedure well with no complications    Post-procedure details: wound care instructions given      Related Procedures  Follow Up In Dermatology - Established Patient    2. Angioma of skin  Scattered cherry-red papule(s).    A cherry hemangioma is a small macule (small, flat, smooth area) or papule (small, solid bump) formed from an overgrowth of tiny blood vessels in the skin. Cherry hemangiomas are characteristically red or purplish in color. They often first appear in middle adulthood and usually increase in number with age. Cherry hemangiomas are noncancerous (benign) and are common in adults.    The present appearance of the lesion is not worrisome but it should continue to be observed and testing/treatment may be warranted if change occurs.    Related Procedures  Follow Up In Dermatology - Established Patient  Follow Up In Dermatology -  Established Patient    3. Benign nevus  Scattered, uniform and benign-appearing, regular brown melanocytic papules and macules.    1. Right axilla has a 2 x 2 mm black brown papule  2. Left buttocks has a 2.5 x 4 mm tan brown macule with homogenous pattern with reticular pattern on the medial aspect     The present appearance of the lesions are not worrisome but it should continue to be observed and testing/treatment may be warranted if change occurs.    Lesions being monitored are stable.     Related Procedures  Follow Up In Dermatology - Established Patient  Follow Up In Dermatology - Established Patient    4. Seborrheic keratosis  Stuck on verrucous, tan-brown papules and plaques.      Seborrheic keratoses are common noncancerous (benign) growths of unknown cause seen in adults due to a thickening of an area of the top skin layer. Seborrheic keratoses may appear as if they are stuck on to the skin. They have distinct borders, and they may appear as papules (small, solid bumps) or plaques (solid, raised patches that are bigger than a thumbnail). They may be the same color as your skin, or they may be pink, light brown, darker brown, or very dark brown, or sometimes may appear black.    There is no way to prevent new seborrheic keratoses from forming. Seborrheic keratoses can be removed, but removal is considered a cosmetic issue and is usually not covered by insurance.    PLAN  No treatment is needed unless there is irritation from clothing, such as itching or bleeding.  2.   Some lotions containing alpha hydroxy acids, salicylic acid, or urea may make the areas feel smoother with regular use but will not eliminate them.    Related Procedures  Follow Up In Dermatology - Established Patient  Follow Up In Dermatology - Established Patient    5. Lentigo  Scattered tan macules in sun-exposed areas.    A solar lentigo (plural, solar lentigines), also known as a sun-induced freckle or senile lentigo, is a dark  (hyperpigmented) lesion caused by natural or artificial ultraviolet (UV) light. Solar lentigines may be single or multiple. This type of lentigo is different from a simple lentigo (lentigo simplex) because it is caused by exposure to UV light. Solar lentigines are benign, but they do indicate excessive sun exposure, a risk factor for the development of skin cancer.    To prevent solar lentigines, avoid exposure to sunlight in midday (10 AM to 3 PM), wear sun-protective clothing (tightly woven clothes and hats), and apply sunscreen (SPF 30 UVA and UVB block).    The present appearance of the lesion is not worrisome but it should continue to be observed and testing/treatment may be warranted if change occurs.    Related Procedures  Follow Up In Dermatology - [facility] Patient  Follow Up In [facility] Patient    6. Scar conditions and fibrosis of skin  Right Nasal Sidewall  Well healed scar at the site(s) of prior treatment with no evidence of recurrence.          The scar is clear, there is no evidence of recurrence.  The present appearance of the scar is not worrisome but it should continue to be observed and testing/treatment may be warranted if change occurs.      Related Procedures  Follow Up In Dermatology [facility] Patient  Follow Up In [facility] Patient    7. History of nonmelanoma skin cancer    ABCDEs of melanoma and atypical moles were discussed with the patient.    Patient was instructed to perform monthly self skin examination.  We recommended that the patient have regular full skin exams given an increased risk of subsequent skin cancers.    The patient was instructed to use sun protective behaviors including use of broad spectrum sunscreens and sun protective clothing to reduce risk of skin cancers.    Warning signs of non-melanoma skin cancer discussed.    Related Procedures  Follow Up In Dermatology - [facility] Patient  Follow Up In [facility]  Patient    8. Skin changes due to chronic exposure to nonionizing radiation  Actinic changes in the form of freckles, lentigines and hyper/hypopigmentation     ABCDEs of melanoma and atypical moles were discussed with the patient.    Patient was instructed to perform monthly self skin examination.  We recommended that the patient have regular full skin exams given an increased risk of subsequent skin cancers.    The patient was instructed to use sun protective behaviors including use of broad spectrum sunscreens and sun protective clothing to reduce risk of skin cancers.    Warning signs of non-melanoma skin cancer discussed.    Related Procedures  Follow Up In Dermatology - Established Patient  Follow Up In Dermatology - Established Patient        Return to clinic in 1 year for skin check/follow up or sooner if needed

## 2025-01-31 NOTE — PATIENT INSTRUCTIONS

## 2025-03-04 ENCOUNTER — APPOINTMENT (OUTPATIENT)
Dept: PHYSICAL MEDICINE AND REHAB | Facility: CLINIC | Age: 70
End: 2025-03-04
Payer: MEDICARE

## 2025-03-10 ENCOUNTER — DOCUMENTATION (OUTPATIENT)
Dept: PHYSICAL THERAPY | Facility: CLINIC | Age: 70
End: 2025-03-10

## 2025-03-10 ENCOUNTER — EVALUATION (OUTPATIENT)
Dept: PHYSICAL THERAPY | Facility: CLINIC | Age: 70
End: 2025-03-10
Payer: MEDICARE

## 2025-03-10 DIAGNOSIS — M70.61 TROCHANTERIC BURSITIS OF RIGHT HIP: ICD-10-CM

## 2025-03-10 DIAGNOSIS — M54.16 RIGHT LUMBAR RADICULITIS: ICD-10-CM

## 2025-03-10 DIAGNOSIS — M47.816 LUMBAR SPONDYLOSIS: ICD-10-CM

## 2025-03-10 DIAGNOSIS — R29.898 WEAKNESS OF RIGHT LOWER EXTREMITY: ICD-10-CM

## 2025-03-10 DIAGNOSIS — M25.551 RIGHT HIP PAIN: Primary | ICD-10-CM

## 2025-03-10 PROCEDURE — 97110 THERAPEUTIC EXERCISES: CPT | Mod: GP

## 2025-03-10 PROCEDURE — 97161 PT EVAL LOW COMPLEX 20 MIN: CPT | Mod: GP

## 2025-03-10 ASSESSMENT — ENCOUNTER SYMPTOMS
DEPRESSION: 0
LOSS OF SENSATION IN FEET: 0
OCCASIONAL FEELINGS OF UNSTEADINESS: 0

## 2025-03-10 NOTE — PROGRESS NOTES
"Physical Therapy    Physical Therapy Evaluation and Treatment      Patient Name: Sherice Apodaca \"Sherice Kowalski"  MRN: [id number]  Today's Date: 3/10/2025    Time Entry:   Time Calculation  Start Time: 1047  Stop Time: 1133  Time Calculation (min): 46 min  PT Evaluation Time Entry  PT Evaluation (Low) Time Entry: 35  PT Therapeutic Procedures Time Entry  Therapeutic Exercise Time Entry: 9                   Assessment:  PT Assessment  PT Assessment Results: Decreased strength, Decreased range of motion, Pain  Rehab Prognosis: Good  Evaluation/Treatment Tolerance: Patient tolerated treatment well   The patient is a 68 y/o female being seen in outpatient therapy to address reports of right hip pain right-sided low back. The patient demonstrates the following impairments: impaired lumbar AROM, impaired R hip AROM, decreased RLE strength per MMT, impaired gait mechanics, and patient reports of pain in right hip and low back and reports of N/T in lateral right calf at night. The above listed impairments lead to the following functional limitations: decreased sleep quality and quantity due to pain in right hip and low back, difficulty completing sit to stand transfers, and difficulty participating in hobbies/leisure activities such as golfing. The patient would benefit from skilled physical therapy services to address the above listed impairments and functional deficits in order to return the patient to their PLOF and improve QoL.      Plan:  OP PT Plan  Treatment/Interventions: Cryotherapy, Education/ Instruction, Dry needling, Electrical stimulation, Hot pack, Manual therapy, Neuromuscular re-education, Therapeutic activities, Therapeutic exercises, Ultrasound, Taping techniques  PT Plan: Skilled PT  PT Frequency: 2 times per week  Duration: 5-6 weeks  Onset Date: 01/21/25  Number of Treatments Authorized: mn  Rehab Potential: Good  Plan of Care Agreement: Patient    Current Problem:   1. Right hip pain        2. Trochanteric " "bursitis of right hip  Referral to Physical Therapy    Follow Up In Physical Therapy      3. Right lumbar radiculitis  Referral to Physical Therapy    Follow Up In Physical Therapy      4. Lumbar spondylosis  Referral to Physical Therapy    Follow Up In Physical Therapy      5. Weakness of right lower extremity            Subjective    The patient notes her hip and low back have been bothering her for years. Notes it is most bothersome at night, wakes up 5-6x/night. Also has pain when getting up from seated and once she initially starts walking. Once she is moving around during the day, does not bother her much. Walks 2-3 miles a day and the pain does not impact her. Notes she will get N/T down lateral right calf at night. No N/T at present. Notes no saddle paraesthesia. No changes in B/B function.   No ANGEL, gradual onset over the past couple of years. Had x-ray in 2022 of low back, no recent imaging. No imaging of right hip. To trial PT first per referring provider before more imaging is obtained. No osteoporosis or history of fractures. One recent fall onto right knee when playing with granddaughter, had swelling on right knee that comes and goes.     Patient's goal for therapy: \"To be able to sleep at night.\"      General:  General  Reason for Referral: Diagnosis  M70.61 (ICD-10-CM) - Trochanteric bursitis of right hip  M54.16 (ICD-10-CM) - Right lumbar radiculitis  M47.816 (ICD-10-CM) - Lumbar spondylosis  Referred By: Dr. Mili Hernández  Past Medical History Relevant to Rehab: hx of left wrist fracture, no other PMH noted by patient  Family/Caregiver Present: No  General Comment: Visit 1, onset 1/21/25,no auth  Precautions:  Precautions  STEADI Fall Risk Score (The score of 4 or more indicates an increased risk of falling): 4  Precautions Comment: no osteoporosis, hx of skin cancer on nose, no pacemaker/implanted devices    Pain:   Location: lateral right hip, right side of low back, occasionally right " knee  Description: achy   Current: 2-3/10  Best: 0/10; moving around, walking, better during the day, will get up and walk around and that helps   Worst: 7/10; laying on right side, at night when trying to sleep, laying on back, golfing   Patient noted no increased pain at end of session and left therapy in no distress.     Home Living:   Multi story home, one story living. Lives with . No concerns. Completes ADLs/IADLs IND. Sleeps in regular bed.     Prior Level of Function:   PLOF: has had pain in low back/R hip for about 6 years.   CLOF: dayron to complete ADLs/IADLs IND. Difficulty sleeping due to pain. Retired- used to work at [employer] in [employer].   Hobbies: walking, puzzles, golfing     Objective   Lumbar AROM  Flexion: 100%  Extension: 50%; pain in right hip   Right SB: 50%, no change   Left SB:  50%, slight pain in back   Right Rotation: 75%, no change   Left Rotation:  75%, no change       HIP  Functional Rating Scale  LEFS   /80: 74 points    Hip Palpation/Joint Mobility   Palpation / Joint Mobility Comment: TTP noted along R greater trochanter and right lateral hip/IT band to above knee. No TTP noted along low back/R glute.     Hip AROM  R hip flexion: (125°): 99  L hip flexion: (125°): 92  R hip abduction: (45°): 18 no pain, limited  L hip abduction: (45°): 28  R hip ER: (45°): 40  L hip ER: (45°): 32  R hip IR: (45°): 35  L hip IR: (45°): 37    Specific Lower Extremity MMT  R Iliopsoas: (5/5): 4  L Iliopsoas: (5/5): 5  R Gluteals (prone): (5/5): 3 bridge- pain in R hip  L Gluteals (prone): (5/5): 3 bridge  R Gluteals (sidelying): (5/5): 4  L Gluteals (sidelying): (5/5): 5  R Hip External Rotation: (5/5): 4  L Hip External Rotation: (5/5): 4+  R knee flexion: (5/5): 4  L knee flexion: (5/5): 5  R knee extension: (5/5): 4  L knee extension: (5/5): 5  Hip ADD (R): 4- (hooklying)  Hip ADD (L): 4- (hooklying)     Gait  Gait Comment: The patient presents to therapy ambulating without use of  "assistive device. Patient with increased pronation B, worse on R foot compared to left foot. Normal gait speed and step length. No other obvious gait deficits or deviations noted (Patient completed 4 (6\") steps with reciprocal pattern to ascend and descend with single UE support.)  Flexibility  R hamstrings: good  L hamstrings: good  R piriformis: fair (painful in R hip)   L piriformis: poor     Positive right KENNETH, negative FADIR    Ankle MMT  R ankle dorsiflexion: (5/5): 4  L ankle dorsiflexion: (5/5): 5  R ankle plantarflexion: (5/5): 4  L ankle plantarflexion: (5/5): 4+    Treatments:  Therapeutic Exericse (45442)- 9 min   Supine/Hooklying:   -R/L piriformis stretch 20 sec hold each   -Hooklying hip adduction ball squeeze x 10 reps, 5 sec hold  Hooklying hip abduction x 10 reps against orange tband,5 sec hold     EDUCATION:  Outpatient Education  Individual(s) Educated: Patient  Education Provided: Anatomy, Physiology, POC, Home Exercise Program  Risk and Benefits Discussed with Patient/Caregiver/Other: yes  Patient/Caregiver Demonstrated Understanding: yes  Plan of Care Discussed and Agreed Upon: yes  Patient Response to Education: Patient/Caregiver Verbalized Understanding of Information, Patient/Caregiver Performed Return Demonstration of Exercises/Activities, Patient/Caregiver Asked Appropriate Questions  Patient educated on PT purpose, POC, d/c planning, and importance of HEP compliance.     Access Code: [email or web address]  URL: [email or web address]  Date: 03/10/2025  Prepared by: ALEJA Leyva [facility]    Program Notes  Complete all exercises in pain free range of motion. Hold any exercise that causes pain. Patient given orange tband for HEP.    Exercises  - Supine Piriformis Stretch with Leg Straight  - 2 x daily - 7 x weekly - 1 sets - 2-3 reps - 20-30 sec hold  - Supine Hip Adduction Isometric with Ball  - 2 x daily - 7 x weekly - 2 sets - 10 reps - 5 sec hold  - Hooklying Clamshell with " "Resistance  - 2 x daily - 7 x weekly - 2 sets - 10 reps - 5 sec hold  Goals:  Active       PT Problem       PT Goal 1       Start:  03/10/25    Expected End:  04/21/25       The patient will score 10% higher on LEFS in order to demonstrate improved sx level and improved ability to complete functional mobility tasks and ADLs/IADLs.           PT Goal 2       Start:  03/10/25    Expected End:  04/21/25       The patient will demonstrate 5/5 strength via MMT of RLE in order to improve ability to complete functional activities and mobility for ADLs/IADLs.           PT Goal 3       Start:  03/10/25    Expected End:  04/21/25       The patient will report 0/10 pain in right hip and low back during functional activities and at night in order to demonstrate improved pain levels.           PT Goal 4       Start:  03/10/25    Expected End:  04/21/25       The patient will report the ability to sleep without interruptions due to pain in right hip and low back in order to improve sleep quality and quantity to improve QoL and return the patient to their PLOF           PT Goal 5       Start:  03/10/25    Expected End:  04/21/25       The patient will subjectively report compliance with recommended HEP in order to maximize functional gains during physical therapy plan of care.           Patient Stated Goal 1       Start:  03/10/25    Expected End:  04/21/25       Patient's goal for therapy: \"To be able to sleep at night.\"                    "

## 2025-03-17 ENCOUNTER — TREATMENT (OUTPATIENT)
Dept: PHYSICAL THERAPY | Facility: CLINIC | Age: 70
End: 2025-03-17
Payer: MEDICARE

## 2025-03-17 DIAGNOSIS — M25.551 RIGHT HIP PAIN: ICD-10-CM

## 2025-03-17 DIAGNOSIS — M54.16 RIGHT LUMBAR RADICULITIS: ICD-10-CM

## 2025-03-17 DIAGNOSIS — M47.816 LUMBAR SPONDYLOSIS: ICD-10-CM

## 2025-03-17 DIAGNOSIS — R29.898 WEAKNESS OF RIGHT LOWER EXTREMITY: ICD-10-CM

## 2025-03-17 DIAGNOSIS — M70.61 TROCHANTERIC BURSITIS OF RIGHT HIP: Primary | ICD-10-CM

## 2025-03-17 PROCEDURE — 97110 THERAPEUTIC EXERCISES: CPT | Mod: GP

## 2025-03-17 NOTE — PROGRESS NOTES
Physical Therapy    Physical Therapy Treatment    Patient Name: Sherice Apodaca  MRN: [id number]  Today's Date: 3/17/2025    Time Entry:   Time Calculation  Start Time: 0937  Stop Time: 1019  Time Calculation (min): 42 min     PT Therapeutic Procedures Time Entry  Therapeutic Exercise Time Entry: 42                   Assessment:   In-clinic program initiated this date. Patient completed session with moderate effort. Patient with reports of higher LBP versus right hip pain this date following painting this weekend. Patient noted forward ball roll out positively addressed right sided low back pain, so activity added to HEP this date. Patient with more tightness observed on left piriformis versus right piriformis, but patient noted more discomfort on right versus left at rest. Patient educated to complete all exercises within pain free ROM. Patient with good effort during session this date. Patient would likely continue to benefit from skilled PT services to address goals.     Plan:   Progress POC as able and tolerated by patient. Adjust plan as needed.      Current Problem  1. Trochanteric bursitis of right hip        2. Right lumbar radiculitis        3. Lumbar spondylosis        4. Right hip pain        5. Weakness of right lower extremity            General     General  Reason for Referral: Diagnosis  M70.61 (ICD-10-CM) - Trochanteric bursitis of right hip  M54.16 (ICD-10-CM) - Right lumbar radiculitis  M47.816 (ICD-10-CM) - Lumbar spondylosis  Referred By: Dr. Mlii Hernández  Past Medical History Relevant to Rehab: hx of left wrist fracture, no other PMH noted by patient  Family/Caregiver Present: No  General Comment: Visit 2, onset 1/21/25,no auth    Subjective    The patient notes 7/10 pain on right side of low back and 4/10 pain in R lateral hip/thigh. Notes she did a lot of painting this weekend and that irritated her back. HEP went well. No other new changes or new complaints noted this date.       Precautions  Precautions  STEADI Fall Risk Score (The score of 4 or more indicates an increased risk of falling): 4  Precautions Comment: no osteoporosis, hx of skin cancer on nose, no pacemaker/implanted devices    Pain  7/10 pain in right side of low back, 4/10 pain lateral right hip/thigh at start of session. Patient noted no change in pain at end of session. Patient declined CP. Patient left therapy in no distress.     Objective     Treatments:  Therapeutic Exericse (11249)- 42 min   -Sci fit, seat 10, L1.5 x 6 minutes BLEs active warm up     Supine/Hooklying:   -R/L LTR x 10 reps each, 5 sec hold alternating in pain free ROM   -R/L piriformis stretch 20 sec hold each x 2 reps   -Hooklying hip abduction 2 x 10 reps against orange tband,5 sec hold   -Single leg fall out against orange tband x 15 reps each, 5 sec hold     Seated:   -Seated forward ball roll outs with yellow ball x 10 reps, 5 sec hold     Supine/Hooklying:   -Hooklying hip adduction ball squeeze 2 x 10 reps, 5 sec hold  -Hooklying glute set 5 sec hold 2 x 10 reps, 5 sec hold     Seated:   -R/L seated piriformis stretch 20 sec hold x 2 reps each in pain free ROM (VC and demo for correct exercise technique)        OP EDUCATION:   Correct exercise technique in pain free ROM. Holding any exercise that causes pain. Patient given updated HEP     Access Code: [email or web address]  URL: [email or web address]  Date: 03/17/2025  Prepared by: ALEJA Leyva [facility]    Program Notes  Complete all exercises in pain free range of motion. Hold any exercise that causes pain    Exercises  - Supine Piriformis Stretch with Leg Straight  - 2 x daily - 7 x weekly - 1 sets - 2-3 reps - 20-30 sec hold  - Supine Hip Adduction Isometric with Ball  - 2 x daily - 7 x weekly - 2 sets - 10 reps - 5 sec hold  - Hooklying Clamshell with Resistance  - 2 x daily - 7 x weekly - 2 sets - 10 reps - 5 sec hold  - Seated Flexion Stretch with Swiss Ball  - 2 x daily - 7 x weekly  "- 1-2 sets - 10 reps - 5 sec hold  Goals:  Active       PT Problem       PT Goal 1       Start:  03/10/25    Expected End:  04/21/25       The patient will score 10% higher on LEFS in order to demonstrate improved sx level and improved ability to complete functional mobility tasks and ADLs/IADLs.           PT Goal 2       Start:  03/10/25    Expected End:  04/21/25       The patient will demonstrate 5/5 strength via MMT of RLE in order to improve ability to complete functional activities and mobility for ADLs/IADLs.           PT Goal 3       Start:  03/10/25    Expected End:  04/21/25       The patient will report 0/10 pain in right hip and low back during functional activities and at night in order to demonstrate improved pain levels.           PT Goal 4       Start:  03/10/25    Expected End:  04/21/25       The patient will report the ability to sleep without interruptions due to pain in right hip and low back in order to improve sleep quality and quantity to improve QoL and return the patient to their PLOF           PT Goal 5       Start:  03/10/25    Expected End:  04/21/25       The patient will subjectively report compliance with recommended HEP in order to maximize functional gains during physical therapy plan of care.           Patient Stated Goal 1       Start:  03/10/25    Expected End:  04/21/25       Patient's goal for therapy: \"To be able to sleep at night.\"            "

## 2025-03-24 ENCOUNTER — APPOINTMENT (OUTPATIENT)
Dept: PHYSICAL THERAPY | Facility: CLINIC | Age: 70
End: 2025-03-24
Payer: MEDICARE

## 2025-03-24 ENCOUNTER — DOCUMENTATION (OUTPATIENT)
Dept: PHYSICAL THERAPY | Facility: CLINIC | Age: 70
End: 2025-03-24
Payer: MEDICARE

## 2025-03-24 NOTE — PROGRESS NOTES
"Physical Therapy                 Therapy Communication Note    Patient Name: Sherice Apodaca \"Sherice Liz\"  MRN: [id number]  Department:   Room: Room/bed info not found  Today's Date: 3/24/2025     Discipline: Physical Therapy          Missed Visit Reason: Patient cancelled appointment on MyChart. Patient also left  letting PT know she was cancelling due to not feeling well.     Missed Time: Cancel    Comment:  "

## 2025-03-24 NOTE — PROGRESS NOTES
Physical Therapy    Physical Therapy Treatment    Patient Name: Sherice Apodaca  MRN: [id number]  Today's Date: 3/24/2025    Time Entry:                             Assessment:     Plan:       Current Problem  No diagnosis found.    General          Subjective    Precautions     Vital Signs     Pain       Objective   Cognition     Coordination:     Posture     Extremity/Trunk Assessment  {Extremity/Trunk Assessments:99169}  {Spine,UE,LE,HAND,LYMPHEDEMA:63713}  Activity Tolerance:     Outcome Measures:  {PT Outcome Measures:16600}  Treatments:  {PT Treatments:08447}  Therapeutic Exericse (70478)- 42 min   -Sci fit, seat 10, L1.5 x 6 minutes BLEs active warm up      Supine/Hooklying:   -R/L LTR x 10 reps each, 5 sec hold alternating in pain free ROM   -R/L piriformis stretch 20 sec hold each x 2 reps   -Hooklying hip abduction 2 x 10 reps against orange tband,5 sec hold   -Single leg fall out against orange tband x 15 reps each, 5 sec hold      Seated:   -Seated forward ball roll outs with yellow ball x 10 reps, 5 sec hold      Supine/Hooklying:   -Hooklying hip adduction ball squeeze 2 x 10 reps, 5 sec hold  -Hooklying glute set 5 sec hold 2 x 10 reps, 5 sec hold      Seated:   -R/L seated piriformis stretch 20 sec hold x 2 reps each in pain free ROM (VC and demo for correct exercise technique)     OP EDUCATION:       Goals:  Active       PT Problem       PT Goal 1       Start:  03/10/25    Expected End:  04/21/25       The patient will score 10% higher on LEFS in order to demonstrate improved sx level and improved ability to complete functional mobility tasks and ADLs/IADLs.           PT Goal 2       Start:  03/10/25    Expected End:  04/21/25       The patient will demonstrate 5/5 strength via MMT of RLE in order to improve ability to complete functional activities and mobility for ADLs/IADLs.           PT Goal 3       Start:  03/10/25    Expected End:  04/21/25       The patient will report 0/10 pain in right hip and  "low back during functional activities and at night in order to demonstrate improved pain levels.           PT Goal 4       Start:  03/10/25    Expected End:  04/21/25       The patient will report the ability to sleep without interruptions due to pain in right hip and low back in order to improve sleep quality and quantity to improve QoL and return the patient to their PLOF           PT Goal 5       Start:  03/10/25    Expected End:  04/21/25       The patient will subjectively report compliance with recommended HEP in order to maximize functional gains during physical therapy plan of care.           Patient Stated Goal 1       Start:  03/10/25    Expected End:  04/21/25       Patient's goal for therapy: \"To be able to sleep at night.\"            "

## 2025-04-07 ENCOUNTER — APPOINTMENT (OUTPATIENT)
Dept: PHYSICAL THERAPY | Facility: CLINIC | Age: 70
End: 2025-04-07
Payer: MEDICARE

## 2025-04-14 ENCOUNTER — APPOINTMENT (OUTPATIENT)
Dept: PHYSICAL THERAPY | Facility: CLINIC | Age: 70
End: 2025-04-14
Payer: MEDICARE

## 2025-04-21 ENCOUNTER — APPOINTMENT (OUTPATIENT)
Dept: PHYSICAL THERAPY | Facility: CLINIC | Age: 70
End: 2025-04-21
Payer: MEDICARE

## 2025-04-21 ENCOUNTER — DOCUMENTATION (OUTPATIENT)
Dept: PHYSICAL THERAPY | Facility: CLINIC | Age: 70
End: 2025-04-21
Payer: MEDICARE

## 2025-04-21 ENCOUNTER — APPOINTMENT (OUTPATIENT)
Dept: PHYSICAL MEDICINE AND REHAB | Facility: CLINIC | Age: 70
End: 2025-04-21
Payer: MEDICARE

## 2025-04-21 NOTE — PROGRESS NOTES
Physical Therapy    Physical Therapy Treatment    Patient Name: Sherice Apodaca  MRN: [id number]  Today's Date: 4/21/2025    Time Entry:                             Assessment:     Plan:       Current Problem  No diagnosis found.    General          Subjective    Precautions     Vital Signs     Pain       Objective   Cognition     Coordination:     Posture     Extremity/Trunk Assessment  {Extremity/Trunk Assessments:51763}  {Spine,UE,LE,HAND,LYMPHEDEMA:18699}  Activity Tolerance:     Outcome Measures:  {PT Outcome Measures:51112}  Treatments:  {PT Treatments:82790}  Therapeutic Exericse (03165)- 42 min   -Sci fit, seat 10, L1.5 x 6 minutes BLEs active warm up      Supine/Hooklying:   -R/L LTR x 10 reps each, 5 sec hold alternating in pain free ROM   -R/L piriformis stretch 20 sec hold each x 2 reps   -Hooklying hip abduction 2 x 10 reps against orange tband,5 sec hold   -Single leg fall out against orange tband x 15 reps each, 5 sec hold      Seated:   -Seated forward ball roll outs with yellow ball x 10 reps, 5 sec hold      Supine/Hooklying:   -Hooklying hip adduction ball squeeze 2 x 10 reps, 5 sec hold  -Hooklying glute set 5 sec hold 2 x 10 reps, 5 sec hold      Seated:   -R/L seated piriformis stretch 20 sec hold x 2 reps each in pain free ROM (VC and demo for correct exercise technique)   OP EDUCATION:       Goals:  Active       PT Problem       PT Goal 1       Start:  03/10/25    Expected End:  04/21/25       The patient will score 10% higher on LEFS in order to demonstrate improved sx level and improved ability to complete functional mobility tasks and ADLs/IADLs.           PT Goal 2       Start:  03/10/25    Expected End:  04/21/25       The patient will demonstrate 5/5 strength via MMT of RLE in order to improve ability to complete functional activities and mobility for ADLs/IADLs.           PT Goal 3       Start:  03/10/25    Expected End:  04/21/25       The patient will report 0/10 pain in right hip and low  "back during functional activities and at night in order to demonstrate improved pain levels.           PT Goal 4       Start:  03/10/25    Expected End:  04/21/25       The patient will report the ability to sleep without interruptions due to pain in right hip and low back in order to improve sleep quality and quantity to improve QoL and return the patient to their PLOF           PT Goal 5       Start:  03/10/25    Expected End:  04/21/25       The patient will subjectively report compliance with recommended HEP in order to maximize functional gains during physical therapy plan of care.           Patient Stated Goal 1       Start:  03/10/25    Expected End:  04/21/25       Patient's goal for therapy: \"To be able to sleep at night.\"            "

## 2025-04-21 NOTE — PROGRESS NOTES
"Physical Therapy                 Therapy Communication Note    Patient Name: Sherice Apodaca \"Sherice Liz\"  MRN: [id number]  Department:   Room: Room/bed info not found  Today's Date: 4/21/2025     Discipline: Physical Therapy          Missed Visit Reason:      Missed Time: Cancel    Comment: Patient cancelled appointment due to needing later appt time but no time being available.   "

## 2025-04-28 ENCOUNTER — TREATMENT (OUTPATIENT)
Dept: PHYSICAL THERAPY | Facility: CLINIC | Age: 70
End: 2025-04-28
Payer: MEDICARE

## 2025-04-28 DIAGNOSIS — M47.816 LUMBAR SPONDYLOSIS: ICD-10-CM

## 2025-04-28 DIAGNOSIS — M54.16 RIGHT LUMBAR RADICULITIS: ICD-10-CM

## 2025-04-28 DIAGNOSIS — M70.61 TROCHANTERIC BURSITIS OF RIGHT HIP: ICD-10-CM

## 2025-04-28 PROCEDURE — 97110 THERAPEUTIC EXERCISES: CPT | Mod: GP

## 2025-04-28 NOTE — PROGRESS NOTES
Physical Therapy    Physical Therapy Treatment    Patient Name: Sherice Apodaca  MRN: [id number]  Today's Date: 4/28/2025    Time Entry:   Time Calculation  Start Time: 0934  Stop Time: 1020  Time Calculation (min): 46 min     PT Therapeutic Procedures Time Entry  Therapeutic Exercise Time Entry: 46                   Assessment:   Patient returned to therapy following ~1 month due to having to cancel appointments due to her 's health issues. Patient notes intermittent compliance with HEP and notes it does not seem to make pain better/worse. Patient notes she is most bothered by pain in low back, noting that she has been  doing a lot of yard work.  Reviewed HEP and updated HEP to include DKTC, as patient notes this seemed to decrease low back pain. Patient educated on safe completion of HEP in pain free ROM and to hold any part of HEP that causes pain. Patient noted decreased pain in both low back and R hip at end of session. Patient would likely continue to benefit from skilled PT services.     Plan:   Progress POC as able and tolerated by patient. Adjust plan as needed. Plan to complete recheck next visit.     Current Problem  1. Trochanteric bursitis of right hip  Follow Up In Physical Therapy      2. Right lumbar radiculitis  Follow Up In Physical Therapy      3. Lumbar spondylosis  Follow Up In Physical Therapy          General     General  Reason for Referral: Diagnosis  M70.61 (ICD-10-CM) - Trochanteric bursitis of right hip  M54.16 (ICD-10-CM) - Right lumbar radiculitis  M47.816 (ICD-10-CM) - Lumbar spondylosis  Referred By: Dr. Mili Hernández  Past Medical History Relevant to Rehab: hx of left wrist fracture, no other PMH noted by patient  Family/Caregiver Present: No  General Comment: Visit 3, onset 1/21/25,no auth    Subjective    The patient notes she had to cancel various sessions due to  having health issues. Notes she has been doing some of the home exercises, do not increase or decrease  pain- is the same. Notes no changes or complaints, and no new injuries since last visit. Still having a lot of pain at night and when she first stands up after sitting. Has been doing about 2 hours of yardwork a day, notes it aggravates low back.     Precautions  Precautions  STEADI Fall Risk Score (The score of 4 or more indicates an increased risk of falling): 4  Precautions Comment: no osteoporosis, hx of skin cancer on nose, no pacemaker/implanted devices    Pain   5-6/10 pain in R hip/lateral hip and lateral lower leg and 7/10 pain on right side of low back at start of session.  Patient noted decreased low back pain and decreased R hip pain at end of session (did not rate numerically). Patient left therapy in no distress.     Objective     Treatments:  Therapeutic Exericse (91023)- 46 min   -Sci fit, seat 10, L1.0 x 6 minutes BLEs active warm up      Supine/Hooklying:   -R/L single leg knee to chest, 5 sec hold x 10 reps each (patient noted no change in low back or R hip pain)   -DKTC x 10 reps, 5 sec hold (patient noted it felt like this loosened up back/improved pain), x 10 reps again and pt noted decreased pain in low back (no change in hip pain)   -R/L piriformis stretch 30 sec hold each x 2 reps (verbal cueing for technique)   -Hooklying B hip abduction 2 x 10 reps against orange tband above knees,5 sec hold   -Single leg fall out against orange tband x 15 reps each, 5 sec hold    -Hooklying hip adduction ball squeeze and concurrent glute set x 20 reps, 5 sec hold     Seated:   -Seated forward ball roll outs with yellow ball x 10 reps, 5 sec hold       OP EDUCATION:   Correct exercise technique in pain free ROM. Holding any exercise that causes pain. Pt reported understanding.     Access Code: [email or web address]  URL: [email or web address]  Date: 04/28/2025  Prepared by: ALEJA Leyva CA    Program Notes  Complete all exercises in pain free range of motion. Hold any exercise that causes  "pain    Exercises  - Supine Piriformis Stretch with Leg Straight  - 2 x daily - 7 x weekly - 1 sets - 2-3 reps - 20-30 sec hold  - Supine Hip Adduction Isometric with Ball  - 2 x daily - 7 x weekly - 2 sets - 10 reps - 5 sec hold  - Hooklying Clamshell with Resistance  - 2 x daily - 7 x weekly - 2 sets - 10 reps - 5 sec hold  - Seated Flexion Stretch with Swiss Ball  - 2 x daily - 7 x weekly - 1-2 sets - 10 reps - 5 sec hold  - Supine Double Knee to Chest  - 4 x daily - 7 x weekly - 1 sets - 10 reps - 5 sec hold  Goals:  Active       PT Problem       PT Goal 1       Start:  03/10/25    Expected End:  04/21/25       The patient will score 10% higher on LEFS in order to demonstrate improved sx level and improved ability to complete functional mobility tasks and ADLs/IADLs.           PT Goal 2       Start:  03/10/25    Expected End:  04/21/25       The patient will demonstrate 5/5 strength via MMT of RLE in order to improve ability to complete functional activities and mobility for ADLs/IADLs.           PT Goal 3       Start:  03/10/25    Expected End:  04/21/25       The patient will report 0/10 pain in right hip and low back during functional activities and at night in order to demonstrate improved pain levels.           PT Goal 4       Start:  03/10/25    Expected End:  04/21/25       The patient will report the ability to sleep without interruptions due to pain in right hip and low back in order to improve sleep quality and quantity to improve QoL and return the patient to their PLOF           PT Goal 5       Start:  03/10/25    Expected End:  04/21/25       The patient will subjectively report compliance with recommended HEP in order to maximize functional gains during physical therapy plan of care.           Patient Stated Goal 1       Start:  03/10/25    Expected End:  04/21/25       Patient's goal for therapy: \"To be able to sleep at night.\"            "

## 2025-05-05 ENCOUNTER — APPOINTMENT (OUTPATIENT)
Dept: PHYSICAL THERAPY | Facility: CLINIC | Age: 70
End: 2025-05-05
Payer: MEDICARE

## 2025-05-05 ENCOUNTER — TREATMENT (OUTPATIENT)
Dept: PHYSICAL THERAPY | Facility: CLINIC | Age: 70
End: 2025-05-05
Payer: MEDICARE

## 2025-05-05 DIAGNOSIS — M54.16 RIGHT LUMBAR RADICULITIS: ICD-10-CM

## 2025-05-05 DIAGNOSIS — M25.551 RIGHT HIP PAIN: Primary | ICD-10-CM

## 2025-05-05 DIAGNOSIS — M70.61 TROCHANTERIC BURSITIS OF RIGHT HIP: ICD-10-CM

## 2025-05-05 DIAGNOSIS — R29.898 WEAKNESS OF RIGHT LOWER EXTREMITY: ICD-10-CM

## 2025-05-05 DIAGNOSIS — M47.816 LUMBAR SPONDYLOSIS: ICD-10-CM

## 2025-05-05 PROCEDURE — 97530 THERAPEUTIC ACTIVITIES: CPT | Mod: GP

## 2025-05-05 PROCEDURE — 97110 THERAPEUTIC EXERCISES: CPT | Mod: GP

## 2025-05-05 PROCEDURE — 97140 MANUAL THERAPY 1/> REGIONS: CPT | Mod: GP

## 2025-05-05 NOTE — PROGRESS NOTES
Physical Therapy    Physical Therapy Treatment/Re-Check    Patient Name: Sherice Apodaca  MRN: [id number]  Today's Date: 5/5/2025    Time Entry:   Time Calculation  Start Time: 0935  Stop Time: 1014  Time Calculation (min): 39 min     PT Therapeutic Procedures Time Entry  Manual Therapy Time Entry: 8  Therapeutic Exercise Time Entry: 12  Therapeutic Activity Time Entry: 19                   Assessment:  PT Assessment  PT Assessment Results: Decreased strength, Decreased range of motion, Pain  Rehab Prognosis: Good  Evaluation/Treatment Tolerance: Patient tolerated treatment well  The patient is a 68 y/o female who has attended 4 sessions of skilled physical therapy to address right hip pain and R low back pain. The patient subjectively reports compliance with HEP at least 1x/day. Patient with extended absence after first follow-up due to her  having some health issues and appointments. The patient demonstrated improvements with regard to improved LE strength per MMT, improved right hip AROM, and patient reports of feeling that her back has been doing better. The patient continues to demonstrate impairments with regard to weakness of R hip musculature, slightly decreased R hip and lumbar AROM, impaired BLE muscular flexibility, and patient reports of continued pain in low back and R hip/lateral knee and proximal lower leg (especially at night) . At this time, the patient would likely continue to benefit from skilled PT services. Plan to complete another recheck in 3-4 weeks and proceed as appropriate.     Plan:  OP PT Plan  Treatment/Interventions: Cryotherapy, Education/ Instruction, Gait training, Hot pack, Manual therapy, Neuromuscular re-education, Taping techniques, Therapeutic activities, Therapeutic exercises, Ultrasound  PT Plan: Skilled PT  PT Frequency: 2 times per week  Duration: 3-4 more weeks  Onset Date: 01/21/25  Number of Treatments Authorized: Mn  Rehab Potential: Good  Plan of Care Agreement:  "Patient    Current Problem  1. Right hip pain        2. Trochanteric bursitis of right hip  Follow Up In Physical Therapy      3. Right lumbar radiculitis  Follow Up In Physical Therapy      4. Lumbar spondylosis  Follow Up In Physical Therapy      5. Weakness of right lower extremity            General     General  Reason for Referral: Diagnosis  M70.61 (ICD-10-CM) - Trochanteric bursitis of right hip  M54.16 (ICD-10-CM) - Right lumbar radiculitis  M47.816 (ICD-10-CM) - Lumbar spondylosis  Referred By: Dr. Mili Hernández  Past Medical History Relevant to Rehab: hx of left wrist fracture, no other PMH noted by patient  Family/Caregiver Present: No  General Comment: Visit 4, onset 1/21/25,no auth    Subjective    The patient notes 4/10 pain in low back and R hip this date. Notes she felt okay after last session, back felt a little better. Notes no changes or new complaints. Notes HEP is going well, \"I try to do them at least once a day.\" Notes knees to chest stretch helps her back a lot and she has noticed decreased R lateral calf pain at night since starting DKTC (pt notes no pain in muscle belly of calf, no swelling). Notes she did sleep well last night. But overall still has trouble sleeping at night due to pain. Has not seen Dr. Hernández for follow up yet as she wanted to complete therapy first.      Precautions  Precautions  Precautions Comment: no osteoporosis, hx of skin cancer on nose, no pacemaker/implanted devices    Pain   4/10 pain in low back and R lateral hip at start of session (pt notes she has not had much pain in R lateral calf over past week). Patient noted no increased pain at end of session and left therapy in no distress.     Objective   Lumbar AROM  Flexion: 100%  Extension: 50%; no pain   Right SB: 75%, no change   Left SB:  75%, no change  Right Rotation: 75%, no change   Left Rotation:  50%, no change   Flexion in lying x 10 reps: decreased low back pain, pt notes she has also noticed " decreased pain on lateral R calf since starting DKTC exercise at home     Gait  Gait Comment: The patient presents to therapy ambulating without use of assistive device. Patient with increased pronation B, worse on R foot compared to left foot. Normal gait speed and step length. No other obvious gait deficits or deviations noted.    Flexibility  R hamstrings: good  L hamstrings: good-  R piriformis: fair (painful in R hip)   L piriformis: fair-  R/L gastroc: good      HIP  Positive right KENNETH, negative FADIR    Hip Palpation/Joint Mobility   Palpation / Joint Mobility Comment: TTP noted along R greater trochanter and right lateral hip/IT band to above knee. No TTP noted along R side of low back    Hip AROM  R hip ER: (45°): 44  L hip ER: (45°): 35  R hip IR: (45°): 43  L hip IR: (45°): 44    Specific Lower Extremity MMT  R Iliopsoas: (5/5): 4+  L Iliopsoas: (5/5): 4+  R knee flexion: (5/5): 4+  L knee flexion: (5/5): 4+  R knee extension: (5/5): 5  L knee extension: (5/5): 5  Ankle MMT  R ankle dorsiflexion: (5/5): 5  L ankle dorsiflexion: (5/5): 5  R ankle plantarflexion: (5/5): 4+  L ankle plantarflexion: (5/5): 4+    Treatments:  Therapeutic Activity (60751)- 19 min   -Re-check completed this date. Please see goals and objective for details. Patient educated on outcomes of re-check and POC going forward.      Therapeutic Exercise (47827)- 12 min   -Sci fit, seat 10, L2 x 8 minutes LEs only for active warm up   -Standing R/L IT band stretch/lateral hamstring stretch 20 sec hold x 2 reps (pt noted this felt good, so verbally added to HEP     Manual Therapy (69108)- 8 min   -Foam rolling to right hip in left sidelying (pillow between knees) to address tightness in right lateral hip and knee/proximal lower leg   -Pt noted no increased pain following manual therapy     OP EDUCATION:  Outpatient Education  Individual(s) Educated: Patient  Education Provided: Anatomy, Home Exercise Program, POC,  Physiology  Patient/Caregiver Demonstrated Understanding: yes  Plan of Care Discussed and Agreed Upon: yes  Patient Response to Education: Patient/Caregiver Verbalized Understanding of Information, Patient/Caregiver Performed Return Demonstration of Exercises/Activities  Correct exercise technique. Progress toward goals.     Goals:  Active       PT Problem       PT Goal 1       Start:  03/10/25    Expected End:  06/02/25       The patient will score 10% higher on LEFS in order to demonstrate improved sx level and improved ability to complete functional mobility tasks and ADLs/IADLs.        Goal Note       Not assessed this date              PT Goal 2       Start:  03/10/25    Expected End:  06/02/25       The patient will demonstrate 5/5 strength via MMT of RLE in order to improve ability to complete functional activities and mobility for ADLs/IADLs.           PT Goal 3 (Progressing)       Start:  03/10/25    Expected End:  06/02/25       The patient will report 0/10 pain in right hip and low back during functional activities and at night in order to demonstrate improved pain levels.        Goal Note       Pt notes 4/10 pain in low back and R hip this date.               PT Goal 4 (Not Progressing)       Start:  03/10/25    Expected End:  06/02/25       The patient will report the ability to sleep without interruptions due to pain in right hip and low back in order to improve sleep quality and quantity to improve QoL and return the patient to their PLOF        Goal Note       Pt notes she still has a lot of interruptions in sleep due to pain in R hip              PT Goal 5 (Progressing)       Start:  03/10/25    Expected End:  06/02/25       The patient will subjectively report compliance with recommended HEP in order to maximize functional gains during physical therapy plan of care.           Patient Stated Goal 1 (Not Progressing)       Start:  03/10/25    Expected End:  06/02/25       Patient's goal for therapy:  "\"To be able to sleep at night.\"            "

## 2025-05-12 ENCOUNTER — APPOINTMENT (OUTPATIENT)
Dept: PHYSICAL THERAPY | Facility: CLINIC | Age: 70
End: 2025-05-12
Payer: MEDICARE

## 2025-05-19 ENCOUNTER — APPOINTMENT (OUTPATIENT)
Dept: PHYSICAL THERAPY | Facility: CLINIC | Age: 70
End: 2025-05-19
Payer: MEDICARE

## 2025-06-09 ENCOUNTER — APPOINTMENT (OUTPATIENT)
Dept: PHYSICAL THERAPY | Facility: CLINIC | Age: 70
End: 2025-06-09
Payer: MEDICARE

## 2025-07-30 ENCOUNTER — HOSPITAL ENCOUNTER (OUTPATIENT)
Dept: RADIOLOGY | Facility: HOSPITAL | Age: 70
Discharge: HOME | End: 2025-07-30
Payer: MEDICARE

## 2025-07-30 DIAGNOSIS — R92.8 ABNORMAL MAMMOGRAM: ICD-10-CM

## 2025-07-30 PROCEDURE — 77065 DX MAMMO INCL CAD UNI: CPT | Mod: RIGHT SIDE | Performed by: STUDENT IN AN ORGANIZED HEALTH CARE EDUCATION/TRAINING PROGRAM

## 2025-07-30 PROCEDURE — 77061 BREAST TOMOSYNTHESIS UNI: CPT | Mod: RIGHT SIDE | Performed by: STUDENT IN AN ORGANIZED HEALTH CARE EDUCATION/TRAINING PROGRAM

## 2025-07-30 PROCEDURE — 76642 ULTRASOUND BREAST LIMITED: CPT | Mod: RIGHT SIDE | Performed by: STUDENT IN AN ORGANIZED HEALTH CARE EDUCATION/TRAINING PROGRAM

## 2025-07-30 PROCEDURE — 77065 DX MAMMO INCL CAD UNI: CPT | Mod: RT

## 2025-07-30 PROCEDURE — 76642 ULTRASOUND BREAST LIMITED: CPT | Mod: RT

## 2025-07-30 PROCEDURE — 76982 USE 1ST TARGET LESION: CPT | Mod: RT,LT

## 2025-08-22 ENCOUNTER — APPOINTMENT (OUTPATIENT)
Dept: ORTHOPEDIC SURGERY | Facility: CLINIC | Age: 70
End: 2025-08-22
Payer: MEDICARE

## 2025-09-05 ENCOUNTER — APPOINTMENT (OUTPATIENT)
Dept: ORTHOPEDIC SURGERY | Facility: CLINIC | Age: 70
End: 2025-09-05
Payer: MEDICARE

## 2025-09-05 ENCOUNTER — HOSPITAL ENCOUNTER (OUTPATIENT)
Dept: RADIOLOGY | Facility: CLINIC | Age: 70
Discharge: HOME | End: 2025-09-05
Payer: MEDICARE

## 2025-09-05 DIAGNOSIS — M25.551 RIGHT HIP PAIN: ICD-10-CM

## 2025-09-05 PROCEDURE — 73502 X-RAY EXAM HIP UNI 2-3 VIEWS: CPT | Mod: RIGHT SIDE | Performed by: RADIOLOGY

## 2025-09-05 PROCEDURE — 73502 X-RAY EXAM HIP UNI 2-3 VIEWS: CPT | Mod: RT

## 2025-09-05 ASSESSMENT — PAIN - FUNCTIONAL ASSESSMENT: PAIN_FUNCTIONAL_ASSESSMENT: 0-10

## 2025-09-05 ASSESSMENT — PAIN DESCRIPTION - DESCRIPTORS: DESCRIPTORS: THROBBING;SHARP

## 2025-09-05 ASSESSMENT — PAIN SCALES - GENERAL: PAINLEVEL_OUTOF10: 5 - MODERATE PAIN

## 2026-02-02 ENCOUNTER — APPOINTMENT (OUTPATIENT)
Dept: DERMATOLOGY | Facility: CLINIC | Age: 71
End: 2026-02-02
Payer: MEDICARE